# Patient Record
Sex: FEMALE | Race: OTHER | Employment: OTHER | URBAN - METROPOLITAN AREA
[De-identification: names, ages, dates, MRNs, and addresses within clinical notes are randomized per-mention and may not be internally consistent; named-entity substitution may affect disease eponyms.]

---

## 2017-01-09 ENCOUNTER — HOSPITAL ENCOUNTER (OUTPATIENT)
Dept: GENERAL RADIOLOGY | Age: 72
Discharge: HOME | End: 2017-01-09
Payer: MEDICARE

## 2017-01-09 DIAGNOSIS — R05.9 COUGH: ICD-10-CM

## 2017-01-09 PROCEDURE — 71020 XR CHEST PA LAT: CPT

## 2017-09-18 PROBLEM — I77.9 BILATERAL CAROTID ARTERY DISEASE (HCC): Status: ACTIVE | Noted: 2017-09-18

## 2017-09-19 ENCOUNTER — HOSPITAL ENCOUNTER (OUTPATIENT)
Dept: LAB | Age: 72
Discharge: HOME | End: 2017-09-19
Payer: MEDICARE

## 2017-09-19 DIAGNOSIS — I25.10 CORONARY ARTERY DISEASE INVOLVING NATIVE CORONARY ARTERY OF NATIVE HEART WITHOUT ANGINA PECTORIS: ICD-10-CM

## 2017-09-19 DIAGNOSIS — E78.00 PURE HYPERCHOLESTEROLEMIA: Chronic | ICD-10-CM

## 2017-09-19 LAB
ALBUMIN SERPL-MCNC: 3.9 G/DL (ref 3.2–4.6)
ALBUMIN/GLOB SERPL: 0.9 {RATIO} (ref 1.2–3.5)
ALP SERPL-CCNC: 84 U/L (ref 50–136)
ALT SERPL-CCNC: 31 U/L (ref 12–65)
ANION GAP SERPL CALC-SCNC: 10 MMOL/L (ref 7–16)
AST SERPL-CCNC: 28 U/L (ref 15–37)
BASOPHILS # BLD: 0 K/UL (ref 0–0.2)
BASOPHILS NFR BLD: 0 % (ref 0–2)
BILIRUB SERPL-MCNC: 0.4 MG/DL (ref 0.2–1.1)
BUN SERPL-MCNC: 18 MG/DL (ref 8–23)
CALCIUM SERPL-MCNC: 9 MG/DL (ref 8.3–10.4)
CHLORIDE SERPL-SCNC: 107 MMOL/L (ref 98–107)
CHOLEST SERPL-MCNC: 208 MG/DL
CO2 SERPL-SCNC: 27 MMOL/L (ref 21–32)
CREAT SERPL-MCNC: 0.68 MG/DL (ref 0.6–1)
DIFFERENTIAL METHOD BLD: NORMAL
EOSINOPHIL # BLD: 0.1 K/UL (ref 0–0.8)
EOSINOPHIL NFR BLD: 3 % (ref 0.5–7.8)
ERYTHROCYTE [DISTWIDTH] IN BLOOD BY AUTOMATED COUNT: 14.3 % (ref 11.9–14.6)
GLOBULIN SER CALC-MCNC: 4.5 G/DL (ref 2.3–3.5)
GLUCOSE SERPL-MCNC: 104 MG/DL (ref 65–100)
HCT VFR BLD AUTO: 40.1 % (ref 35.8–46.3)
HDLC SERPL-MCNC: 44 MG/DL (ref 40–60)
HDLC SERPL: 4.7 {RATIO}
HGB BLD-MCNC: 13.6 G/DL (ref 11.7–15.4)
IMM GRANULOCYTES # BLD: 0 K/UL (ref 0–0.5)
IMM GRANULOCYTES NFR BLD: 0 % (ref 0–5)
LDLC SERPL CALC-MCNC: 120.2 MG/DL
LIPID PROFILE,FLP: ABNORMAL
LYMPHOCYTES # BLD: 2 K/UL (ref 0.5–4.6)
LYMPHOCYTES NFR BLD: 43 % (ref 13–44)
MAGNESIUM SERPL-MCNC: 2.3 MG/DL (ref 1.8–2.4)
MCH RBC QN AUTO: 28.6 PG (ref 26.1–32.9)
MCHC RBC AUTO-ENTMCNC: 33.9 G/DL (ref 31.4–35)
MCV RBC AUTO: 84.4 FL (ref 79.6–97.8)
MONOCYTES # BLD: 0.3 K/UL (ref 0.1–1.3)
MONOCYTES NFR BLD: 6 % (ref 4–12)
NEUTS SEG # BLD: 2.3 K/UL (ref 1.7–8.2)
NEUTS SEG NFR BLD: 48 % (ref 43–78)
PLATELET # BLD AUTO: 197 K/UL (ref 150–450)
PMV BLD AUTO: 12.2 FL (ref 10.8–14.1)
POTASSIUM SERPL-SCNC: 4 MMOL/L (ref 3.5–5.1)
PROT SERPL-MCNC: 8.4 G/DL (ref 6.3–8.2)
RBC # BLD AUTO: 4.75 M/UL (ref 4.05–5.25)
SODIUM SERPL-SCNC: 144 MMOL/L (ref 136–145)
TRIGL SERPL-MCNC: 219 MG/DL (ref 35–150)
TSH SERPL DL<=0.005 MIU/L-ACNC: 1.81 UIU/ML (ref 0.36–3.74)
VLDLC SERPL CALC-MCNC: 43.8 MG/DL (ref 6–23)
WBC # BLD AUTO: 4.8 K/UL (ref 4.3–11.1)

## 2017-09-19 PROCEDURE — 36415 COLL VENOUS BLD VENIPUNCTURE: CPT | Performed by: INTERNAL MEDICINE

## 2017-09-19 PROCEDURE — 83735 ASSAY OF MAGNESIUM: CPT | Performed by: INTERNAL MEDICINE

## 2017-09-19 PROCEDURE — 85025 COMPLETE CBC W/AUTO DIFF WBC: CPT | Performed by: INTERNAL MEDICINE

## 2017-09-19 PROCEDURE — 80053 COMPREHEN METABOLIC PANEL: CPT | Performed by: INTERNAL MEDICINE

## 2017-09-19 PROCEDURE — 84443 ASSAY THYROID STIM HORMONE: CPT | Performed by: INTERNAL MEDICINE

## 2017-09-19 PROCEDURE — 80061 LIPID PANEL: CPT | Performed by: INTERNAL MEDICINE

## 2017-09-19 NOTE — PROGRESS NOTES
Please call patient, labs looked pretty good. TSH and Hb normal.  I hope she will feel better off the statin. Wait on NST as well. Review more at follow up.   Thanks

## 2017-12-26 ENCOUNTER — HOSPITAL ENCOUNTER (OUTPATIENT)
Dept: LAB | Age: 72
Discharge: HOME | End: 2017-12-26
Payer: MEDICARE

## 2017-12-26 DIAGNOSIS — E78.00 PURE HYPERCHOLESTEROLEMIA: Chronic | ICD-10-CM

## 2017-12-26 LAB
ALBUMIN SERPL-MCNC: 3.7 G/DL (ref 3.2–4.6)
ALBUMIN/GLOB SERPL: 0.8 {RATIO} (ref 1.2–3.5)
ALP SERPL-CCNC: 89 U/L (ref 50–136)
ALT SERPL-CCNC: 46 U/L (ref 12–65)
ANION GAP SERPL CALC-SCNC: 8 MMOL/L (ref 7–16)
AST SERPL-CCNC: 41 U/L (ref 15–37)
BILIRUB SERPL-MCNC: 0.3 MG/DL (ref 0.2–1.1)
BUN SERPL-MCNC: 21 MG/DL (ref 8–23)
CALCIUM SERPL-MCNC: 9.4 MG/DL (ref 8.3–10.4)
CHLORIDE SERPL-SCNC: 106 MMOL/L (ref 98–107)
CHOLEST SERPL-MCNC: 248 MG/DL
CO2 SERPL-SCNC: 27 MMOL/L (ref 21–32)
CREAT SERPL-MCNC: 0.77 MG/DL (ref 0.6–1)
GLOBULIN SER CALC-MCNC: 4.8 G/DL (ref 2.3–3.5)
GLUCOSE SERPL-MCNC: 146 MG/DL (ref 65–100)
HDLC SERPL-MCNC: 40 MG/DL (ref 40–60)
HDLC SERPL: 6.2 {RATIO}
LDLC SERPL CALC-MCNC: ABNORMAL MG/DL
LDLC SERPL DIRECT ASSAY-MCNC: 96 MG/DL
LIPID PROFILE,FLP: ABNORMAL
POTASSIUM SERPL-SCNC: 4.5 MMOL/L (ref 3.5–5.1)
PROT SERPL-MCNC: 8.5 G/DL (ref 6.3–8.2)
SODIUM SERPL-SCNC: 141 MMOL/L (ref 136–145)
TRIGL SERPL-MCNC: >400 MG/DL (ref 35–150)
VLDLC SERPL CALC-MCNC: ABNORMAL MG/DL (ref 6–23)

## 2017-12-26 PROCEDURE — 83721 ASSAY OF BLOOD LIPOPROTEIN: CPT | Performed by: INTERNAL MEDICINE

## 2017-12-26 PROCEDURE — 36415 COLL VENOUS BLD VENIPUNCTURE: CPT | Performed by: INTERNAL MEDICINE

## 2017-12-26 PROCEDURE — 80061 LIPID PANEL: CPT | Performed by: INTERNAL MEDICINE

## 2017-12-26 PROCEDURE — 80053 COMPREHEN METABOLIC PANEL: CPT | Performed by: INTERNAL MEDICINE

## 2018-09-24 ENCOUNTER — HOSPITAL ENCOUNTER (OUTPATIENT)
Dept: LAB | Age: 73
Discharge: HOME | End: 2018-09-24
Payer: MEDICARE

## 2018-09-24 DIAGNOSIS — E78.00 PURE HYPERCHOLESTEROLEMIA: Chronic | ICD-10-CM

## 2018-09-24 LAB
ALBUMIN SERPL-MCNC: 3.8 G/DL (ref 3.2–4.6)
ALBUMIN/GLOB SERPL: 0.9 {RATIO} (ref 1.2–3.5)
ALP SERPL-CCNC: 76 U/L (ref 50–136)
ALT SERPL-CCNC: 55 U/L (ref 12–65)
ANION GAP SERPL CALC-SCNC: 4 MMOL/L (ref 7–16)
AST SERPL-CCNC: 56 U/L (ref 15–37)
BILIRUB SERPL-MCNC: 0.3 MG/DL (ref 0.2–1.1)
BUN SERPL-MCNC: 17 MG/DL (ref 8–23)
CALCIUM SERPL-MCNC: 8.8 MG/DL (ref 8.3–10.4)
CHLORIDE SERPL-SCNC: 107 MMOL/L (ref 98–107)
CHOLEST SERPL-MCNC: 97 MG/DL
CO2 SERPL-SCNC: 30 MMOL/L (ref 21–32)
CREAT SERPL-MCNC: 0.99 MG/DL (ref 0.6–1)
EST. AVERAGE GLUCOSE BLD GHB EST-MCNC: 128 MG/DL
GLOBULIN SER CALC-MCNC: 4.4 G/DL (ref 2.3–3.5)
GLUCOSE SERPL-MCNC: 134 MG/DL (ref 65–100)
HBA1C MFR BLD: 6.1 % (ref 4.8–6)
HDLC SERPL-MCNC: 38 MG/DL (ref 40–60)
HDLC SERPL: 2.6 {RATIO}
LDLC SERPL CALC-MCNC: ABNORMAL MG/DL
LIPID PROFILE,FLP: ABNORMAL
POTASSIUM SERPL-SCNC: 5 MMOL/L (ref 3.5–5.1)
PROT SERPL-MCNC: 8.2 G/DL (ref 6.3–8.2)
SODIUM SERPL-SCNC: 141 MMOL/L (ref 136–145)
TRIGL SERPL-MCNC: 324 MG/DL (ref 35–150)
VLDLC SERPL CALC-MCNC: 64.8 MG/DL (ref 6–23)

## 2018-09-24 PROCEDURE — 80053 COMPREHEN METABOLIC PANEL: CPT

## 2018-09-24 PROCEDURE — 83036 HEMOGLOBIN GLYCOSYLATED A1C: CPT

## 2018-09-24 PROCEDURE — 36415 COLL VENOUS BLD VENIPUNCTURE: CPT

## 2018-09-24 PROCEDURE — 80061 LIPID PANEL: CPT

## 2019-04-05 ENCOUNTER — HOSPITAL ENCOUNTER (OUTPATIENT)
Dept: LAB | Age: 74
Discharge: HOME | End: 2019-04-05
Payer: MEDICARE

## 2019-04-05 DIAGNOSIS — I25.10 CORONARY ARTERY DISEASE INVOLVING NATIVE CORONARY ARTERY OF NATIVE HEART WITHOUT ANGINA PECTORIS: ICD-10-CM

## 2019-04-05 LAB
ALBUMIN SERPL-MCNC: 4.1 G/DL (ref 3.2–4.6)
ALBUMIN/GLOB SERPL: 1.1 {RATIO} (ref 1.2–3.5)
ALP SERPL-CCNC: 73 U/L (ref 50–136)
ALT SERPL-CCNC: 35 U/L (ref 12–65)
ANION GAP SERPL CALC-SCNC: 5 MMOL/L (ref 7–16)
AST SERPL-CCNC: 18 U/L (ref 15–37)
BASOPHILS # BLD: 0 K/UL (ref 0–0.2)
BASOPHILS NFR BLD: 1 % (ref 0–2)
BILIRUB SERPL-MCNC: 0.3 MG/DL (ref 0.2–1.1)
BUN SERPL-MCNC: 21 MG/DL (ref 8–23)
CALCIUM SERPL-MCNC: 9 MG/DL (ref 8.3–10.4)
CHLORIDE SERPL-SCNC: 108 MMOL/L (ref 98–107)
CHOLEST SERPL-MCNC: 209 MG/DL
CO2 SERPL-SCNC: 27 MMOL/L (ref 21–32)
CREAT SERPL-MCNC: 0.74 MG/DL (ref 0.6–1)
DIFFERENTIAL METHOD BLD: NORMAL
EOSINOPHIL # BLD: 0.2 K/UL (ref 0–0.8)
EOSINOPHIL NFR BLD: 4 % (ref 0.5–7.8)
ERYTHROCYTE [DISTWIDTH] IN BLOOD BY AUTOMATED COUNT: 13.8 % (ref 11.9–14.6)
EST. AVERAGE GLUCOSE BLD GHB EST-MCNC: 171 MG/DL
GLOBULIN SER CALC-MCNC: 3.7 G/DL (ref 2.3–3.5)
GLUCOSE SERPL-MCNC: 131 MG/DL (ref 65–100)
HBA1C MFR BLD: 7.6 % (ref 4.8–6)
HCT VFR BLD AUTO: 40.9 % (ref 35.8–46.3)
HDLC SERPL-MCNC: 44 MG/DL (ref 40–60)
HDLC SERPL: 4.8 {RATIO}
HGB BLD-MCNC: 13.3 G/DL (ref 11.7–15.4)
IMM GRANULOCYTES # BLD AUTO: 0 K/UL (ref 0–0.5)
IMM GRANULOCYTES NFR BLD AUTO: 0 % (ref 0–5)
LDLC SERPL CALC-MCNC: 108.6 MG/DL
LIPID PROFILE,FLP: ABNORMAL
LYMPHOCYTES # BLD: 2.3 K/UL (ref 0.5–4.6)
LYMPHOCYTES NFR BLD: 41 % (ref 13–44)
MCH RBC QN AUTO: 28.9 PG (ref 26.1–32.9)
MCHC RBC AUTO-ENTMCNC: 32.5 G/DL (ref 31.4–35)
MCV RBC AUTO: 88.9 FL (ref 79.6–97.8)
MONOCYTES # BLD: 0.6 K/UL (ref 0.1–1.3)
MONOCYTES NFR BLD: 10 % (ref 4–12)
NEUTS SEG # BLD: 2.5 K/UL (ref 1.7–8.2)
NEUTS SEG NFR BLD: 44 % (ref 43–78)
NRBC # BLD: 0 K/UL (ref 0–0.2)
PLATELET # BLD AUTO: 219 K/UL (ref 150–450)
PMV BLD AUTO: 11.9 FL (ref 9.4–12.3)
POTASSIUM SERPL-SCNC: 4.6 MMOL/L (ref 3.5–5.1)
PROT SERPL-MCNC: 7.8 G/DL (ref 6.3–8.2)
RBC # BLD AUTO: 4.6 M/UL (ref 4.05–5.2)
SODIUM SERPL-SCNC: 140 MMOL/L (ref 136–145)
TRIGL SERPL-MCNC: 282 MG/DL (ref 35–150)
TSH SERPL DL<=0.005 MIU/L-ACNC: 1.42 UIU/ML (ref 0.36–3.74)
VLDLC SERPL CALC-MCNC: 56.4 MG/DL (ref 6–23)
WBC # BLD AUTO: 5.7 K/UL (ref 4.3–11.1)

## 2019-04-05 PROCEDURE — 83036 HEMOGLOBIN GLYCOSYLATED A1C: CPT

## 2019-04-05 PROCEDURE — 85025 COMPLETE CBC W/AUTO DIFF WBC: CPT

## 2019-04-05 PROCEDURE — 84443 ASSAY THYROID STIM HORMONE: CPT

## 2019-04-05 PROCEDURE — 80061 LIPID PANEL: CPT

## 2019-04-05 PROCEDURE — 80053 COMPREHEN METABOLIC PANEL: CPT

## 2019-04-05 PROCEDURE — 36415 COLL VENOUS BLD VENIPUNCTURE: CPT

## 2022-03-20 PROBLEM — I77.9 BILATERAL CAROTID ARTERY DISEASE (HCC): Status: ACTIVE | Noted: 2017-09-18

## 2022-06-24 ENCOUNTER — TELEPHONE (OUTPATIENT)
Dept: CARDIOLOGY CLINIC | Age: 77
End: 2022-06-24

## 2022-06-24 NOTE — TELEPHONE ENCOUNTER
Spoke with patient- She reports having a cough for 1 month. Her last OV was 1/30/2020 Recommended she follow up with PCP or Urgent Care for evaluation, but scheduled appt for 8/31/22 in [facility] at 9:45am with Dr. Nisreen Gallardo. . She verbalized understanding.

## 2022-08-31 ENCOUNTER — OFFICE VISIT (OUTPATIENT)
Dept: CARDIOLOGY CLINIC | Age: 77
End: 2022-08-31
Payer: COMMERCIAL

## 2022-08-31 VITALS
HEART RATE: 78 BPM | HEIGHT: 61 IN | BODY MASS INDEX: 32.1 KG/M2 | WEIGHT: 170 LBS | DIASTOLIC BLOOD PRESSURE: 89 MMHG | SYSTOLIC BLOOD PRESSURE: 179 MMHG

## 2022-08-31 DIAGNOSIS — E78.00 PURE HYPERCHOLESTEROLEMIA: ICD-10-CM

## 2022-08-31 DIAGNOSIS — I65.23 BILATERAL CAROTID ARTERY STENOSIS: ICD-10-CM

## 2022-08-31 DIAGNOSIS — I10 ESSENTIAL HYPERTENSION WITH GOAL BLOOD PRESSURE LESS THAN 140/90: Primary | ICD-10-CM

## 2022-08-31 DIAGNOSIS — I25.10 CORONARY ARTERY DISEASE INVOLVING NATIVE CORONARY ARTERY OF NATIVE HEART WITHOUT ANGINA PECTORIS: ICD-10-CM

## 2022-08-31 DIAGNOSIS — Z95.1 S/P CABG X 4: ICD-10-CM

## 2022-08-31 PROCEDURE — 99214 OFFICE O/P EST MOD 30 MIN: CPT | Performed by: INTERNAL MEDICINE

## 2022-08-31 PROCEDURE — 93000 ELECTROCARDIOGRAM COMPLETE: CPT | Performed by: INTERNAL MEDICINE

## 2022-08-31 PROCEDURE — 1123F ACP DISCUSS/DSCN MKR DOCD: CPT | Performed by: INTERNAL MEDICINE

## 2022-08-31 RX ORDER — CHLORAL HYDRATE 500 MG
CAPSULE ORAL
COMMUNITY

## 2022-08-31 RX ORDER — MAGNESIUM 30 MG
30 TABLET ORAL 2 TIMES DAILY
COMMUNITY

## 2022-08-31 RX ORDER — MULTIVIT WITH MINERALS/LUTEIN
250 TABLET ORAL DAILY
COMMUNITY

## 2022-08-31 RX ORDER — IBUPROFEN 200 MG
200 TABLET ORAL EVERY 6 HOURS PRN
COMMUNITY

## 2022-08-31 NOTE — PROGRESS NOTES
[address], [address] [facility], [address] [place] [address]  PHONE: [phone]     21    NAME:  Ximena Herrera  : 1945  MRN: [id number]       SUBJECTIVE:   Ximena Herrera is a 67 y.o. female seen for a follow up visit regarding the following:     Chief Complaint   Patient presents with    Coronary Artery Disease     Yearly, Last 2020        HPI:    Here for CAD, 4v CABG on 16 by Dr. Jose Luis Butterfield. Normal EF on echo. Less than 50% ICAs . Echo 2019: normal EF. Ace cough     Getting over PNA now. No CP, pressure. Some GIBBONS, some rare back pains. Some mid back pains with exercise. Been on avx. Patient denies recent history of orthopnea, PND, excessive dizziness and/or syncope. Ace cough after CABG. From [facility], here since . Past Medical History, Past Surgical History, Family history, Social History, and Medications were all reviewed with the patient today and updated as necessary. Current Outpatient Medications   Medication Sig Dispense Refill    magnesium 30 MG tablet Take 30 mg by mouth 2 times daily      Omega-3 1000 MG CAPS Take by mouth      Ascorbic Acid (VITAMIN C) 250 MG tablet Take 250 mg by mouth daily      Garlic 10 MG CAPS Take by mouth      Potassium 75 MG TABS Take by mouth      ibuprofen (ADVIL;MOTRIN) 200 MG tablet Take 200 mg by mouth every 6 hours as needed for Pain      aspirin 81 MG EC tablet Take 81 mg by mouth daily      ferrous sulfate (IRON 325) 325 (65 Fe) MG tablet Take 325 mg by mouth daily (with breakfast)       No current facility-administered medications for this visit. Allergies   Allergen Reactions    Ace Inhibitors Other (See Comments)    Penicillins Other (See Comments)     Head felt \"swimmy,\" occurred at 22 yrs of age.      Patient Active Problem List    Diagnosis Date Noted    Bilateral carotid artery disease ([facility] [address] 2017    Essential hypertension with goal blood pressure less than 140/90  08/29/2019 02:37 PM    K 4.1 08/29/2019 02:37 PM     08/29/2019 02:37 PM    CO2 22 08/29/2019 02:37 PM    BUN 20 08/29/2019 02:37 PM    CREATININE 0.72 08/29/2019 02:37 PM    GLUCOSE 101 08/29/2019 02:37 PM    CALCIUM 9.6 08/29/2019 02:37 PM        No results found for: WBC, HGB, HCT, MCV, PLT    No results found for: TSHFT4, TSH    Lab Results   Component Value Date    LABA1C 6.7 (H) 08/29/2019     Lab Results   Component Value Date     08/29/2019       Lab Results   Component Value Date    CHOL 262 (H) 08/29/2019     Lab Results   Component Value Date    TRIG 482 (H) 08/29/2019     Lab Results   Component Value Date    HDL 38 (L) 08/29/2019     Lab Results   Component Value Date    LDLCALC Comment 08/29/2019     Lab Results   Component Value Date    LABVLDL Comment 08/29/2019     No results found for: CHOLHDLRATIO        I have Independently reviewed prior care notes, any ER records available, cardiac testing, labs and results with the patient and before seeing the patient today. Also independently reviewed outside records when available. ASSESSMENT:    Fuentes Orr was seen today for coronary artery disease. Diagnoses and all orders for this visit:    Essential hypertension with goal blood pressure less than 140/90  -     EKG 12 lead    Bilateral carotid artery stenosis    Coronary artery disease involving native coronary artery of native heart without angina pectoris  -     Nuclear stress test with myocardial perfusion; Future    S/P CABG x 4  -     Nuclear stress test with myocardial perfusion; Future    Pure hypercholesterolemia        PLAN:         1. HTN:  BP better at home, she has extreme white coat HTN. Need to follow as stressed. 2. CAD: Remain on ASA, and off repatha. Work on diabetes mgmt, lipids mgmt, get back on repatha. Given these risk factors and symptoms as outlined, plan for NST.   We did review options of NST, LHC, other stress testing and agreed to proceed with NST in our office. To ER for worsening angina. Plan on definitive LHC for worsening angina. 3. HPL: off repatha and statin now. Need to re-address. 4. Carotid Dz: Re-eval in the future. On ASA now. Patient has been instructed and agrees to call our office with any issues or other concerns related to their cardiac condition(s) and/or complaint(s).         Return for Return After Test.       SANAZ PRADHAN,   8/31/2022

## 2022-09-27 ENCOUNTER — OFFICE VISIT (OUTPATIENT)
Dept: CARDIOLOGY CLINIC | Age: 77
End: 2022-09-27
Payer: COMMERCIAL

## 2022-09-27 VITALS
DIASTOLIC BLOOD PRESSURE: 70 MMHG | BODY MASS INDEX: 31.72 KG/M2 | WEIGHT: 168 LBS | SYSTOLIC BLOOD PRESSURE: 135 MMHG | HEART RATE: 80 BPM | HEIGHT: 61 IN

## 2022-09-27 DIAGNOSIS — I10 ESSENTIAL HYPERTENSION WITH GOAL BLOOD PRESSURE LESS THAN 140/90: ICD-10-CM

## 2022-09-27 DIAGNOSIS — I65.23 BILATERAL CAROTID ARTERY STENOSIS: ICD-10-CM

## 2022-09-27 DIAGNOSIS — I25.119 CORONARY ARTERY DISEASE INVOLVING NATIVE CORONARY ARTERY OF NATIVE HEART WITH ANGINA PECTORIS (HCC): Primary | ICD-10-CM

## 2022-09-27 PROCEDURE — 99214 OFFICE O/P EST MOD 30 MIN: CPT | Performed by: INTERNAL MEDICINE

## 2022-09-27 PROCEDURE — 1123F ACP DISCUSS/DSCN MKR DOCD: CPT | Performed by: INTERNAL MEDICINE

## 2022-09-27 NOTE — TELEPHONE ENCOUNTER
----- Message from Nathan Fry DO sent at 9/27/2022 10:12 AM EDT -----  Can we get her back on repatha, could not afford it.   Thanks

## 2022-09-27 NOTE — PROGRESS NOTES
[address], [address] [facility], [address] [place] [address]  PHONE: [phone]     21    NAME:  Lilly Orellana  : 1945  MRN: [id number]       SUBJECTIVE:   Lilly Orellana is a 67 y.o. female seen for a follow up visit regarding the following:     Chief Complaint   Patient presents with    Coronary Artery Disease     [facility]ite f/u       HPI:  Here for CAD, 4v CABG on 16 by Dr. Alberto Moses. Less than 50% ICAs . Echo 2019: normal EF. Ace cough    NST 2022: There is a moderate severity left ventricular stress perfusion defect that is medium to large in size present in the inferolateral segment(s) that is partially reversible. EF 44%. Feeling better now, GIBBONS better since PNA. No CP, pressure. Energy better now. Patient denies recent history of orthopnea, PND, excessive dizziness and/or syncope. Ace cough after CABG. From [facility], here since . Past Medical History, Past Surgical History, Family history, Social History, and Medications were all reviewed with the patient today and updated as necessary. Current Outpatient Medications   Medication Sig Dispense Refill    magnesium 30 MG tablet Take 30 mg by mouth 2 times daily      Omega-3 1000 MG CAPS Take by mouth      Ascorbic Acid (VITAMIN C) 250 MG tablet Take 250 mg by mouth daily      Garlic 10 MG CAPS Take by mouth      Potassium 75 MG TABS Take by mouth      ibuprofen (ADVIL;MOTRIN) 200 MG tablet Take 200 mg by mouth every 6 hours as needed for Pain      aspirin 81 MG EC tablet Take 81 mg by mouth daily      ferrous sulfate (IRON 325) 325 (65 Fe) MG tablet Take 325 mg by mouth daily (with breakfast)       No current facility-administered medications for this visit. Allergies   Allergen Reactions    Ace Inhibitors Other (See Comments)    Penicillins Other (See Comments)     Head felt \"swimmy,\" occurred at 22 yrs of age.      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 History of breast cancer 06/17/2016    Dyspnea on exertion 06/17/2016    Chest pain 06/17/2016    GERD (gastroesophageal reflux disease) 06/17/2016    Colon cancer screening 06/17/2016      Past Surgical History:   Procedure Laterality Date    BREAST LUMPECTOMY Left     CARDIAC VALVE SURGERY  2006    CORONARY ARTERY BYPASS GRAFT  7/27/16    x 4 vessels, Dr. Yosi Rodriges (CERVIX STATUS UNKNOWN)       History reviewed. No pertinent family history. Social History     Tobacco Use    Smoking status: Never    Smokeless tobacco: Never   Substance Use Topics    Alcohol use: Yes     Alcohol/week: 1.0 standard drink         ROS:    No obvious pertinent positives on review of systems except for what was outlined in the HPI today.       PHYSICAL EXAM:     /70 Comment: home re-check she claims  Pulse 80   Ht 5' 1\" (1.549 m)   Wt 168 lb (76.2 kg)   BMI 31.74 kg/m²    General/Constitutional:   Alert and oriented x 3, no acute distress  HEENT:   normocephalic, atraumatic, moist mucous membranes  Neck:   No JVD or carotid bruits bilaterally  Cardiovascular:   regular rate and rhythm, no murmur/rub/gallop appreciated  Pulmonary:   clear to auscultation bilaterally, no respiratory distress  Abdomen:   soft, non-tender, non-distended  Ext:   No sig LE edema bilaterally  Skin:  warm and dry, no obvious rashes seen  Neuro:   no obvious sensory or motor deficits  Psychiatric:   normal mood and affect      Lab Results   Component Value Date/Time     08/29/2019 02:37 PM    K 4.1 08/29/2019 02:37 PM     08/29/2019 02:37 PM    CO2 22 08/29/2019 02:37 PM    BUN 20 08/29/2019 02:37 PM    CREATININE 0.72 08/29/2019 02:37 PM    GLUCOSE 101 08/29/2019 02:37 PM    CALCIUM 9.6 08/29/2019 02:37 PM        No results found for: WBC, HGB, HCT, MCV, PLT    No results found for: TSHFT4, TSH    Lab Results   Component Value Date    LABA1C 6.7 (H) 08/29/2019     Lab Results   Component Value Date     08/29/2019       Lab Results   Component Value Date    CHOL 262 (H) 08/29/2019     Lab Results   Component Value Date    TRIG 482 (H) 08/29/2019     Lab Results   Component Value Date    HDL 38 (L) 08/29/2019     Lab Results   Component Value Date    LDLCALC Comment 08/29/2019     Lab Results   Component Value Date    LABVLDL Comment 08/29/2019     No results found for: CHOLHDLRATIO        I have Independently reviewed prior care notes, any ER records available, cardiac testing, labs and results with the patient and before seeing the patient today. Also independently reviewed outside records when available. ASSESSMENT:    Gabriel Rodriguez was seen today for coronary artery disease. Diagnoses and all orders for this visit:    Coronary artery disease involving native coronary artery of native heart with angina pectoris (HCC)  -     Transthoracic echocardiogram (TTE) complete with contrast, bubble, strain, and 3D PRN; Future    Bilateral carotid artery stenosis    Essential hypertension with goal blood pressure less than 140/90        PLAN:    1. HTN:  BP better at home, she has extreme white coat HTN. Need to follow as stressed. 2. CAD: Remain on ASA, and off repatha. Work on diabetes mgmt, lipids mgmt, get back on repatha. NST noted, but feeling better now, hold on [facility]. Check echo and call back PRN. The patient has been instructed to call with any angina or equivalent as reviewed today.  All questions were answered with the patient voicing complete understanding. 3. HPL:  will try to get back on repatha now. 4. Carotid Dz: Re-eval in the future. On ASA now. Patient has been instructed and agrees to call our office with any issues or other concerns related to their cardiac condition(s) and/or complaint(s). Return in about 3 months (around 12/27/2022).        SANAZ PRADHAN, DO  9/27/2022

## 2022-10-05 ENCOUNTER — CLINICAL DOCUMENTATION (OUTPATIENT)
Dept: CARDIOLOGY CLINIC | Age: 77
End: 2022-10-05

## 2022-10-05 NOTE — PROGRESS NOTES
PA for repatha submitted via [facility] and is under review at this time.

## 2022-11-01 ENCOUNTER — NURSE ONLY (OUTPATIENT)
Dept: FAMILY MEDICINE CLINIC | Facility: CLINIC | Age: 77
End: 2022-11-01

## 2022-11-01 ENCOUNTER — OFFICE VISIT (OUTPATIENT)
Dept: FAMILY MEDICINE CLINIC | Facility: CLINIC | Age: 77
End: 2022-11-01
Payer: COMMERCIAL

## 2022-11-01 VITALS
SYSTOLIC BLOOD PRESSURE: 162 MMHG | HEART RATE: 70 BPM | DIASTOLIC BLOOD PRESSURE: 80 MMHG | RESPIRATION RATE: 18 BRPM | HEIGHT: 61 IN | TEMPERATURE: 98.1 F | OXYGEN SATURATION: 98 % | BODY MASS INDEX: 32.66 KG/M2 | WEIGHT: 173 LBS

## 2022-11-01 DIAGNOSIS — E78.00 PURE HYPERCHOLESTEROLEMIA: ICD-10-CM

## 2022-11-01 DIAGNOSIS — I25.119 CORONARY ARTERY DISEASE INVOLVING NATIVE CORONARY ARTERY OF NATIVE HEART WITH ANGINA PECTORIS (HCC): ICD-10-CM

## 2022-11-01 DIAGNOSIS — I10 ESSENTIAL HYPERTENSION: ICD-10-CM

## 2022-11-01 DIAGNOSIS — R06.02 SOB (SHORTNESS OF BREATH) ON EXERTION: ICD-10-CM

## 2022-11-01 DIAGNOSIS — E11.59 TYPE 2 DIABETES MELLITUS WITH OTHER CIRCULATORY COMPLICATIONS (HCC): ICD-10-CM

## 2022-11-01 DIAGNOSIS — Z00.00 MEDICARE ANNUAL WELLNESS VISIT, SUBSEQUENT: Primary | ICD-10-CM

## 2022-11-01 DIAGNOSIS — K21.00 GASTROESOPHAGEAL REFLUX DISEASE WITH ESOPHAGITIS WITHOUT HEMORRHAGE: ICD-10-CM

## 2022-11-01 LAB
ALBUMIN SERPL-MCNC: 3.9 G/DL (ref 3.2–4.6)
ALBUMIN/GLOB SERPL: 1.1 {RATIO} (ref 0.4–1.6)
ALP SERPL-CCNC: 65 U/L (ref 50–136)
ALT SERPL-CCNC: 37 U/L (ref 12–65)
ANION GAP SERPL CALC-SCNC: 8 MMOL/L (ref 2–11)
AST SERPL-CCNC: 24 U/L (ref 15–37)
BASOPHILS # BLD: 0.1 K/UL (ref 0–0.2)
BASOPHILS NFR BLD: 1 % (ref 0–2)
BILIRUB SERPL-MCNC: 0.2 MG/DL (ref 0.2–1.1)
BUN SERPL-MCNC: 15 MG/DL (ref 8–23)
CALCIUM SERPL-MCNC: 9.5 MG/DL (ref 8.3–10.4)
CHLORIDE SERPL-SCNC: 104 MMOL/L (ref 101–110)
CHOLEST SERPL-MCNC: 226 MG/DL
CO2 SERPL-SCNC: 26 MMOL/L (ref 21–32)
CREAT SERPL-MCNC: 0.7 MG/DL (ref 0.6–1)
DIFFERENTIAL METHOD BLD: NORMAL
EOSINOPHIL # BLD: 0.2 K/UL (ref 0–0.8)
EOSINOPHIL NFR BLD: 3 % (ref 0.5–7.8)
ERYTHROCYTE [DISTWIDTH] IN BLOOD BY AUTOMATED COUNT: 13.9 % (ref 11.9–14.6)
EST. AVERAGE GLUCOSE BLD GHB EST-MCNC: 160 MG/DL
GLOBULIN SER CALC-MCNC: 3.6 G/DL (ref 2.8–4.5)
GLUCOSE SERPL-MCNC: 124 MG/DL (ref 65–100)
HBA1C MFR BLD: 7.2 % (ref 4.8–5.6)
HCT VFR BLD AUTO: 40.4 % (ref 35.8–46.3)
HDLC SERPL-MCNC: 37 MG/DL (ref 40–60)
HDLC SERPL: 6.1 {RATIO}
HGB BLD-MCNC: 12.9 G/DL (ref 11.7–15.4)
IMM GRANULOCYTES # BLD AUTO: 0 K/UL (ref 0–0.5)
IMM GRANULOCYTES NFR BLD AUTO: 0 % (ref 0–5)
LDLC SERPL CALC-MCNC: ABNORMAL MG/DL
LDLC SERPL DIRECT ASSAY-MCNC: 65 MG/DL
LYMPHOCYTES # BLD: 2.8 K/UL (ref 0.5–4.6)
LYMPHOCYTES NFR BLD: 39 % (ref 13–44)
MCH RBC QN AUTO: 29.1 PG (ref 26.1–32.9)
MCHC RBC AUTO-ENTMCNC: 31.9 G/DL (ref 31.4–35)
MCV RBC AUTO: 91.2 FL (ref 82–102)
MONOCYTES # BLD: 0.5 K/UL (ref 0.1–1.3)
MONOCYTES NFR BLD: 7 % (ref 4–12)
NEUTS SEG # BLD: 3.6 K/UL (ref 1.7–8.2)
NEUTS SEG NFR BLD: 50 % (ref 43–78)
NRBC # BLD: 0 K/UL (ref 0–0.2)
PLATELET # BLD AUTO: 194 K/UL (ref 150–450)
PMV BLD AUTO: 12 FL (ref 9.4–12.3)
POTASSIUM SERPL-SCNC: 4 MMOL/L (ref 3.5–5.1)
PROT SERPL-MCNC: 7.5 G/DL (ref 6.3–8.2)
RBC # BLD AUTO: 4.43 M/UL (ref 4.05–5.2)
SODIUM SERPL-SCNC: 138 MMOL/L (ref 133–143)
TRIGL SERPL-MCNC: 591 MG/DL (ref 35–150)
VLDLC SERPL CALC-MCNC: 118.2 MG/DL (ref 6–23)
WBC # BLD AUTO: 7.1 K/UL (ref 4.3–11.1)

## 2022-11-01 PROCEDURE — G0439 PPPS, SUBSEQ VISIT: HCPCS | Performed by: FAMILY MEDICINE

## 2022-11-01 PROCEDURE — 1123F ACP DISCUSS/DSCN MKR DOCD: CPT | Performed by: FAMILY MEDICINE

## 2022-11-01 PROCEDURE — 3075F SYST BP GE 130 - 139MM HG: CPT | Performed by: FAMILY MEDICINE

## 2022-11-01 PROCEDURE — 3078F DIAST BP <80 MM HG: CPT | Performed by: FAMILY MEDICINE

## 2022-11-01 ASSESSMENT — PATIENT HEALTH QUESTIONNAIRE - PHQ9
1. LITTLE INTEREST OR PLEASURE IN DOING THINGS: 1
SUM OF ALL RESPONSES TO PHQ QUESTIONS 1-9: 1

## 2022-11-01 ASSESSMENT — LIFESTYLE VARIABLES
HOW MANY STANDARD DRINKS CONTAINING ALCOHOL DO YOU HAVE ON A TYPICAL DAY: 1 OR 2
HOW OFTEN DO YOU HAVE A DRINK CONTAINING ALCOHOL: MONTHLY OR LESS

## 2022-11-01 NOTE — PATIENT INSTRUCTIONS
PLAN:  1)  Referral to gastroenterology; for look into esophagus and stomach. 2)  Check labs today. We will call with results. 3)  We will get a CT scan to rule out other causes of shortness of breath and fatigue. 4)  Follow up in 3 months; sooner if needed. Personalized Preventive Plan for Dick Stewart - 11/1/2022  Medicare offers a range of preventive health benefits. Some of the tests and screenings are paid in full while other may be subject to a deductible, co-insurance, and/or copay. Some of these benefits include a comprehensive review of your medical history including lifestyle, illnesses that may run in your family, and various assessments and screenings as appropriate. After reviewing your medical record and screening and assessments performed today your provider may have ordered immunizations, labs, imaging, and/or referrals for you. A list of these orders (if applicable) as well as your Preventive Care list are included within your After Visit Summary for your review. Other Preventive Recommendations:    A preventive eye exam performed by an eye specialist is recommended every 1-2 years to screen for glaucoma; cataracts, macular degeneration, and other eye disorders. A preventive dental visit is recommended every 6 months. Try to get at least 150 minutes of exercise per week or 10,000 steps per day on a pedometer . Order or download the FREE \"Exercise & Physical Activity: Your Everyday Guide\" from [employer]. Call 1-664.830.2212 or search [employer] online. You need 5819-6733 mg of calcium and 2599-8448 IU of vitamin D per day. It is possible to meet your calcium requirement with diet alone, but a vitamin D supplement is usually necessary to meet this goal.  When exposed to the sun, use a sunscreen that protects against both UVA and UVB radiation with an SPF of 30 or greater.  Reapply every 2 to 3 hours or after sweating, drying off with a towel, or swimming. Always wear a seat belt when traveling in a car. Always wear a helmet when riding a bicycle or motorcycle.

## 2022-11-01 NOTE — PROGRESS NOTES
Medicare Annual Wellness Visit    Lemuel Traylor is here for Medicare AWV    Assessment & Plan   Medicare annual wellness visit, subsequent  Pure hypercholesterolemia  -     Lipid Panel; Future  -     CBC with Auto Differential; Future  Type 2 diabetes mellitus with other circulatory complications (HCC)  -     Hemoglobin A1C; Future  -     CBC with Auto Differential; Future  Coronary artery disease involving native coronary artery of native heart with angina pectoris ([address]  -     Lipid Panel; Future  -     Comprehensive Metabolic Panel; Future  Essential hypertension  SOB (shortness of breath) on exertion  -     CT CHEST WO CONTRAST; Future  Gastroesophageal reflux disease with esophagitis without hemorrhage  -     [facility]            PLAN:  1)  Referral to gastroenterology; for look into esophagus and stomach. 2)  Check labs today. We will call with results. 3)  We will get a CT scan to rule out other causes of shortness of breath and fatigue. 4)  Follow up in 3 months; sooner if needed. Recommendations for Preventive Services Due: see orders and patient instructions/AVS.  Recommended screening schedule for the next 5-10 years is provided to the patient in written form: see Patient Instructions/AVS.     Return in about 3 months (around 2/1/2023) for Labs today, EOV. Subjective     68 yo female with underlying NIDDM, HTN, CAD, s/p CABG, GERD, h/o breast cancer,   Has not been seen in our office for 3 years. She had pneumonia in June, 2022. Feels like she has not recovered since then. Getting short of breath with any exertion. H/o breast cancer in 2005. Then 10 normal mammogram.  Told she doesn't need. Recurrent acid reflux and burning. Has never had a EGD. HTN:  Reports it is good at home. She checks regulaly. Always elevated at Drs office. Has not been checking blood sugar or taking any medication for diabetes.   Last A1c was 3 years ago: 6.7 She has elevated cholesterol and triglycerides. Thought to be famial.   She sees cardiology and is on Repatha injections. Patient's complete Health Risk Assessment and screening values have been reviewed and are found in Flowsheets. The following problems were reviewed today and where indicated follow up appointments were made and/or referrals ordered. Positive Risk Factor Screenings with Interventions:             General Health and ACP:       Advance Directives       Power of  Living Will ACP-Advance Directive ACP-Power of     Not on File Not on File Not on File Not on File          General Health Risk Interventions:  Pain issues: referred to GI for chronic gasstritis    Health Habits/Nutrition:  Physical Activity: Not on file        Body mass index: (!) 32.68     Health Habits/Nutrition Interventions:  Inadequate physical activity:  patient is not ready to increase his/her physical activity level at this time             Objective   Vitals:    11/01/22 1132   BP: (!) 194/83   Pulse: 70   Resp: 18   Temp: 98.1 °F (36.7 °C)   TempSrc: Temporal   SpO2: 98%   Weight: 173 lb (78.5 kg)   Height: 5' 1\" (1.549 m)      Body mass index is 32.69 kg/m². Physical Exam  Vitals and nursing note reviewed. Constitutional:       General: She is not in acute distress. Appearance: She is obese. She is not ill-appearing or toxic-appearing. Cardiovascular:      Rate and Rhythm: Normal rate and regular rhythm. Pulmonary:      Effort: Pulmonary effort is normal.      Breath sounds: Normal breath sounds. Skin:     General: Skin is warm. Neurological:      General: No focal deficit present. Mental Status: She is alert and oriented to person, place, and time. Psychiatric:         Mood and Affect: Mood normal.         Behavior: Behavior normal.         Thought Content:  Thought content normal.         Judgment: Judgment normal.            Allergies   Allergen Reactions    Ace

## 2022-11-07 NOTE — RESULT ENCOUNTER NOTE
Please let her know. Labs look good except Diabetes number is up to 7.2. This means her bloodsugar has been borderline controlled. She needs to limit bread, pasta, potatoes, sweetened drinks as a way to control the diabetes. Follow up in February and we will recheck.

## 2022-11-10 ENCOUNTER — HOSPITAL ENCOUNTER (OUTPATIENT)
Dept: CT IMAGING | Age: 77
Discharge: HOME | End: 2022-11-13
Payer: COMMERCIAL

## 2022-11-10 DIAGNOSIS — R06.02 SOB (SHORTNESS OF BREATH) ON EXERTION: ICD-10-CM

## 2022-11-10 PROCEDURE — 71250 CT THORAX DX C-: CPT

## 2022-11-10 NOTE — RESULT ENCOUNTER NOTE
Please let her know, the CT scan of her chest shows her lungs look normal.  No reason seen for her shortness of breath. I will see her at her next visit in February.

## 2022-11-29 ENCOUNTER — NURSE ONLY (OUTPATIENT)
Dept: CARDIOLOGY CLINIC | Age: 77
End: 2022-11-29
Payer: COMMERCIAL

## 2022-11-29 DIAGNOSIS — I25.10 CAD (CORONARY ARTERY DISEASE): Primary | ICD-10-CM

## 2022-11-29 PROCEDURE — 93000 ELECTROCARDIOGRAM COMPLETE: CPT | Performed by: INTERNAL MEDICINE

## 2022-11-29 NOTE — PROGRESS NOTES
Pt walked in office complaining of chest pain and shortness of breath on going for over a month now. Pt saw Dr. Jenny Fenton for sob and had a chest CT which was normal.   EKG performed and reviewed with Dr. Wayne Ochoa who states pt in nsr and looks better than the prior ekg. Make follow up with Dr. Veria Duane and go to ER with worsening symptoms. Patient agreed and verbalized understanding.

## 2022-12-01 PROBLEM — Z00.00 MEDICARE ANNUAL WELLNESS VISIT, SUBSEQUENT: Status: RESOLVED | Noted: 2022-11-01 | Resolved: 2022-12-01

## 2022-12-07 ENCOUNTER — OFFICE VISIT (OUTPATIENT)
Dept: CARDIOLOGY CLINIC | Age: 77
End: 2022-12-07
Payer: COMMERCIAL

## 2022-12-07 VITALS
WEIGHT: 170 LBS | SYSTOLIC BLOOD PRESSURE: 148 MMHG | DIASTOLIC BLOOD PRESSURE: 92 MMHG | BODY MASS INDEX: 32.1 KG/M2 | HEART RATE: 60 BPM | HEIGHT: 61 IN

## 2022-12-07 DIAGNOSIS — Z95.1 S/P CABG X 4: ICD-10-CM

## 2022-12-07 DIAGNOSIS — I50.22 CHRONIC SYSTOLIC (CONGESTIVE) HEART FAILURE (HCC): ICD-10-CM

## 2022-12-07 DIAGNOSIS — E11.59 TYPE 2 DIABETES MELLITUS WITH VASCULAR DISEASE (HCC): ICD-10-CM

## 2022-12-07 DIAGNOSIS — I65.23 BILATERAL CAROTID ARTERY STENOSIS: ICD-10-CM

## 2022-12-07 DIAGNOSIS — R07.2 PRECORDIAL PAIN: ICD-10-CM

## 2022-12-07 DIAGNOSIS — I25.119 CORONARY ARTERY DISEASE INVOLVING NATIVE CORONARY ARTERY OF NATIVE HEART WITH ANGINA PECTORIS (HCC): Primary | ICD-10-CM

## 2022-12-07 DIAGNOSIS — R06.09 DYSPNEA ON EXERTION: ICD-10-CM

## 2022-12-07 PROBLEM — I20.0 ACCELERATING ANGINA (HCC): Status: ACTIVE | Noted: 2022-12-07

## 2022-12-07 PROCEDURE — 99215 OFFICE O/P EST HI 40 MIN: CPT | Performed by: INTERNAL MEDICINE

## 2022-12-07 PROCEDURE — 3075F SYST BP GE 130 - 139MM HG: CPT | Performed by: INTERNAL MEDICINE

## 2022-12-07 PROCEDURE — 3078F DIAST BP <80 MM HG: CPT | Performed by: INTERNAL MEDICINE

## 2022-12-07 PROCEDURE — 1123F ACP DISCUSS/DSCN MKR DOCD: CPT | Performed by: INTERNAL MEDICINE

## 2022-12-07 PROCEDURE — 3051F HG A1C>EQUAL 7.0%<8.0%: CPT | Performed by: INTERNAL MEDICINE

## 2022-12-07 NOTE — PROGRESS NOTES
[address], [address] [facility], [address] [place] [address]  PHONE: [phone]     22    NAME:  Carl Banegas  : 1945  MRN: [id number]       SUBJECTIVE:   Carl Banegas is a 65 y.o. female seen for a follow up visit regarding the following:     Chief Complaint   Patient presents with    Coronary Artery Disease     Echo        HPI:    Here for CAD, 4v CABG on 16 by Dr. Obed Medrano. Less than 50% ICAs . Echo 2019: normal EF. Ace cough     NST 2022: There is a moderate severity left ventricular stress perfusion defect that is medium to large in size present in the inferolateral segment(s) that is partially reversible. EF 44%. Echo 2022: low normal EF. She has been walking more, now having worsening exertional chest pressure, more angina now. Angina worsening last few weeks. Patient denies recent history of orthopnea, PND, excessive dizziness and/or syncope. Ace cough after CABG. From [facility], here since . Past Medical History, Past Surgical History, Family history, Social History, and Medications were all reviewed with the patient today and updated as necessary.      Current Outpatient Medications   Medication Sig Dispense Refill    Alum & Mag Hydroxide-Simeth (ANTACID ANTI-GAS PO) Take by mouth      Evolocumab 140 MG/ML SOAJ Inject 140 mg into the skin every 14 days 6 Adjustable Dose Pre-filled Pen Syringe 3    magnesium 30 MG tablet Take 30 mg by mouth 2 times daily      Omega-3 1000 MG CAPS Take by mouth      Ascorbic Acid (VITAMIN C) 250 MG tablet Take 250 mg by mouth daily      Garlic 10 MG CAPS Take by mouth      Potassium 75 MG TABS Take by mouth      ibuprofen (ADVIL;MOTRIN) 200 MG tablet Take 200 mg by mouth every 6 hours as needed for Pain      aspirin 81 MG EC tablet Take 81 mg by mouth daily      ferrous sulfate (IRON 325) 325 (65 Fe) MG tablet Take 325 mg by mouth daily (with breakfast)       No current facility-administered medications for this visit. Allergies   Allergen Reactions    Ace Inhibitors Other (See Comments)    Penicillins Other (See Comments)     Head felt \"swimmy,\" occurred at 22 yrs of age. 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 History of breast cancer 06/17/2016    Dyspnea on exertion 06/17/2016    Chest pain 06/17/2016    GERD (gastroesophageal reflux disease) 06/17/2016    Colon cancer screening 06/17/2016      Past Surgical History:   Procedure Laterality Date    BREAST LUMPECTOMY Left     CARDIAC VALVE SURGERY  2006    CORONARY ARTERY BYPASS GRAFT  7/27/16    x 4 vessels, Dr. Toney Ogden (CERVIX STATUS UNKNOWN)       No family history on file. Social History     Tobacco Use    Smoking status: Never    Smokeless tobacco: Never   Substance Use Topics    Alcohol use: Yes     Alcohol/week: 1.0 standard drink         ROS:    Constitutional:   Negative for fevers and unexplained weight loss. Eyes:   Negative for visual disturbance. ENT:   Negative for significant hearing loss and tinnitus. Respiratory:   Negative for hemoptysis. Cardiovascular:   Negative except as noted in HPI. Gastrointestinal:   Negative for melena and abdominal pain.   Genitourinary:   Negative for hematuria, renal stones. Integumentary:   Negative for rash or non-healing wounds  Hematologic/Lymphatic:   Negative for excessive bleeding hx or clotting disorder. Musculoskeletal:  Negative for active, unexplained/severe joint pain. Neurological:   Negative for stroke. Behavioral/Psych:   Negative for suicidal ideations. Endocrine:   Negative for uncontrolled diabetic symptoms including polyuria, polydipsia and poor wound healing.          PHYSICAL EXAM:     BP (!) 148/92   Pulse 60   Ht 5' 1\" (1.549 m)   Wt 170 lb (77.1 kg)   LMP  (LMP Unknown)   BMI 32.12 kg/m²    General/Constitutional:   Alert and oriented x 3, no acute distress  HEENT:   normocephalic, atraumatic, moist mucous membranes  Neck:   No JVD or carotid bruits bilaterally  Cardiovascular:   regular rate and rhythm, no murmur/rub/gallop appreciated  Pulmonary:   clear to auscultation bilaterally, no respiratory distress  Abdomen:   soft, non-tender, non-distended  Ext:   No sig LE edema bilaterally  Skin:  warm and dry, no obvious rashes seen  Neuro:   no obvious sensory or motor deficits  Psychiatric:   normal mood and affect      Lab Results   Component Value Date/Time     11/01/2022 12:46 PM    K 4.0 11/01/2022 12:46 PM     11/01/2022 12:46 PM    CO2 26 11/01/2022 12:46 PM    BUN 15 11/01/2022 12:46 PM    CREATININE 0.70 11/01/2022 12:46 PM    GLUCOSE 124 11/01/2022 12:46 PM    CALCIUM 9.5 11/01/2022 12:46 PM        Lab Results   Component Value Date    WBC 7.1 11/01/2022    HGB 12.9 11/01/2022    HCT 40.4 11/01/2022    MCV 91.2 11/01/2022     11/01/2022       No results found for: TSHFT4, TSH    Lab Results   Component Value Date    LABA1C 7.2 (H) 11/01/2022     Lab Results   Component Value Date     11/01/2022       Lab Results   Component Value Date    CHOL 226 (H) 11/01/2022    CHOL 262 (H) 08/29/2019     Lab Results   Component Value Date    TRIG 591 (H) 11/01/2022    TRIG 482 (H) 08/29/2019     Lab Results   Component Value Date    HDL 37 (L) 11/01/2022    HDL 38 (L) 08/29/2019     Lab Results   Component Value Date    [address] Not calculated due to elevated triglyceride level 11/01/2022 [address] Comment 08/29/2019     Lab Results   Component Value Date    LABVLDL 118.2 (H) 11/01/2022    LABVLDL Comment 08/29/2019     Lab Results   Component Value Date    CHOLHDLRATIO 6.1 11/01/2022           I have Independently reviewed prior care notes, any ER records available, cardiac testing, labs and results with the patient and before seeing the patient today. Also independently reviewed outside records when available. ASSESSMENT:    Yary Weinstein was seen today for coronary artery disease. 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 HTN:  BP better at home, she has extreme white coat HTN. Need to follow as stressed. 2. CAD: Remain on ASA. Work on diabetes mgmt, lipids mgmt, back on repatha. Worsening angina, GIBBONS, abnormal NST. Plan on [facility]. Plan for Left Heart Catheterization and possible Percutaneous Coronary Intervention (PCI) at [facility] due to Worsening angina within the last 2 mos as described in HPI. Discussed risk of cardiac catheterization and potential PCI with the patient in detail. These risks include, but are not limited to, bleeding, stroke, heart attack, cardiac arrhythmias, allergic reactions, atheroemboli, acute kidney injury and cardiac arrest/death.   Local complications at the site of catheter insertion were also reviewed and discussed. The patient voiced complete understanding about these risks. The patient agrees to proceed with the aforementioned associated risks. Check labs beforehand. 3. HPL:  back on repatha now. 4. Carotid Dz: Re-eval in the future. On ASA now. Patient has been instructed and agrees to call our office with any issues or other concerns related to their cardiac condition(s) and/or complaint(s).         Return for Return After Test.       SANAZ PRADHAN,   12/7/2022

## 2022-12-12 NOTE — PROGRESS NOTES
Patient pre-assessment complete for [facility] scheduled with Dr. Mae Ponce, arrival time 0730. Patient verified using . Patient instructed to bring a list of all home medications on the day of procedure. NPO status reinforced. Patient informed to take a full dose aspirin 325mg  or 81 mg x 4 on the day of procedure. Instructed they can take all other medications excluding vitamins & supplements. Patient verbalizes understanding of all instructions & denies any questions at this time.

## 2022-12-13 ENCOUNTER — HOSPITAL ENCOUNTER (OUTPATIENT)
Age: 77
Setting detail: OUTPATIENT SURGERY
Discharge: HOME | End: 2022-12-13
Attending: INTERNAL MEDICINE | Admitting: INTERNAL MEDICINE
Payer: MEDICARE

## 2022-12-13 VITALS
OXYGEN SATURATION: 96 % | HEART RATE: 67 BPM | SYSTOLIC BLOOD PRESSURE: 140 MMHG | WEIGHT: 169.97 LBS | RESPIRATION RATE: 16 BRPM | BODY MASS INDEX: 32.09 KG/M2 | HEIGHT: 61 IN | DIASTOLIC BLOOD PRESSURE: 86 MMHG

## 2022-12-13 DIAGNOSIS — I20.0 ACCELERATING ANGINA (HCC): ICD-10-CM

## 2022-12-13 PROBLEM — I77.9 BILATERAL CAROTID ARTERY DISEASE (HCC): Chronic | Status: ACTIVE | Noted: 2017-09-18

## 2022-12-13 PROBLEM — E11.59 TYPE 2 DIABETES MELLITUS WITH OTHER CIRCULATORY COMPLICATIONS (HCC): Chronic | Status: RESOLVED | Noted: 2022-11-01 | Resolved: 2022-12-13

## 2022-12-13 PROBLEM — E11.59 TYPE 2 DIABETES MELLITUS WITH OTHER CIRCULATORY COMPLICATIONS (HCC): Chronic | Status: ACTIVE | Noted: 2022-11-01

## 2022-12-13 PROBLEM — I50.22 CHRONIC SYSTOLIC (CONGESTIVE) HEART FAILURE (HCC): Chronic | Status: ACTIVE | Noted: 2022-12-07

## 2022-12-13 LAB
ANION GAP SERPL CALC-SCNC: 6 MMOL/L (ref 2–11)
BASOPHILS # BLD: 0 K/UL (ref 0–0.2)
BASOPHILS NFR BLD: 0 % (ref 0–2)
BUN SERPL-MCNC: 21 MG/DL (ref 8–23)
CALCIUM SERPL-MCNC: 9.2 MG/DL (ref 8.3–10.4)
CHLORIDE SERPL-SCNC: 109 MMOL/L (ref 101–110)
CO2 SERPL-SCNC: 25 MMOL/L (ref 21–32)
CREAT SERPL-MCNC: 0.8 MG/DL (ref 0.6–1)
DIFFERENTIAL METHOD BLD: NORMAL
ECHO BSA: 1.82 M2
EKG ATRIAL RATE: 67 BPM
EKG DIAGNOSIS: NORMAL
EKG P AXIS: 49 DEGREES
EKG P-R INTERVAL: 178 MS
EKG Q-T INTERVAL: 414 MS
EKG QRS DURATION: 94 MS
EKG QTC CALCULATION (BAZETT): 437 MS
EKG R AXIS: 51 DEGREES
EKG T AXIS: 11 DEGREES
EKG VENTRICULAR RATE: 67 BPM
EOSINOPHIL # BLD: 0.2 K/UL (ref 0–0.8)
EOSINOPHIL NFR BLD: 3 % (ref 0.5–7.8)
ERYTHROCYTE [DISTWIDTH] IN BLOOD BY AUTOMATED COUNT: 13.8 % (ref 11.9–14.6)
GLUCOSE SERPL-MCNC: 194 MG/DL (ref 65–100)
HCT VFR BLD AUTO: 41.3 % (ref 35.8–46.3)
HGB BLD-MCNC: 13.5 G/DL (ref 11.7–15.4)
IMM GRANULOCYTES # BLD AUTO: 0 K/UL (ref 0–0.5)
IMM GRANULOCYTES NFR BLD AUTO: 0 % (ref 0–5)
INR PPP: 0.9
LYMPHOCYTES # BLD: 2.7 K/UL (ref 0.5–4.6)
LYMPHOCYTES NFR BLD: 34 % (ref 13–44)
MAGNESIUM SERPL-MCNC: 2.6 MG/DL (ref 1.8–2.4)
MCH RBC QN AUTO: 29.2 PG (ref 26.1–32.9)
MCHC RBC AUTO-ENTMCNC: 32.7 G/DL (ref 31.4–35)
MCV RBC AUTO: 89.4 FL (ref 82–102)
MONOCYTES # BLD: 0.6 K/UL (ref 0.1–1.3)
MONOCYTES NFR BLD: 8 % (ref 4–12)
NEUTS SEG # BLD: 4.5 K/UL (ref 1.7–8.2)
NEUTS SEG NFR BLD: 55 % (ref 43–78)
NRBC # BLD: 0 K/UL (ref 0–0.2)
PLATELET # BLD AUTO: 199 K/UL (ref 150–450)
PMV BLD AUTO: 11.3 FL (ref 9.4–12.3)
POTASSIUM SERPL-SCNC: 4.3 MMOL/L (ref 3.5–5.1)
PROTHROMBIN TIME: 12.9 SEC (ref 12.6–14.3)
RBC # BLD AUTO: 4.62 M/UL (ref 4.05–5.2)
SODIUM SERPL-SCNC: 140 MMOL/L (ref 133–143)
WBC # BLD AUTO: 8.1 K/UL (ref 4.3–11.1)

## 2022-12-13 PROCEDURE — 99152 MOD SED SAME PHYS/QHP 5/>YRS: CPT | Performed by: INTERNAL MEDICINE

## 2022-12-13 PROCEDURE — 6360000002 HC RX W HCPCS: Performed by: INTERNAL MEDICINE

## 2022-12-13 PROCEDURE — C1894 INTRO/SHEATH, NON-LASER: HCPCS | Performed by: INTERNAL MEDICINE

## 2022-12-13 PROCEDURE — 80048 BASIC METABOLIC PNL TOTAL CA: CPT

## 2022-12-13 PROCEDURE — 83735 ASSAY OF MAGNESIUM: CPT

## 2022-12-13 PROCEDURE — 93459 L HRT ART/GRFT ANGIO: CPT | Performed by: INTERNAL MEDICINE

## 2022-12-13 PROCEDURE — C1760 CLOSURE DEV, VASC: HCPCS | Performed by: INTERNAL MEDICINE

## 2022-12-13 PROCEDURE — C1769 GUIDE WIRE: HCPCS | Performed by: INTERNAL MEDICINE

## 2022-12-13 PROCEDURE — 85610 PROTHROMBIN TIME: CPT

## 2022-12-13 PROCEDURE — 93005 ELECTROCARDIOGRAM TRACING: CPT | Performed by: INTERNAL MEDICINE

## 2022-12-13 PROCEDURE — 85025 COMPLETE CBC W/AUTO DIFF WBC: CPT

## 2022-12-13 PROCEDURE — 2580000003 HC RX 258: Performed by: INTERNAL MEDICINE

## 2022-12-13 PROCEDURE — 99153 MOD SED SAME PHYS/QHP EA: CPT | Performed by: INTERNAL MEDICINE

## 2022-12-13 PROCEDURE — 2500000003 HC RX 250 WO HCPCS: Performed by: INTERNAL MEDICINE

## 2022-12-13 PROCEDURE — 6370000000 HC RX 637 (ALT 250 FOR IP): Performed by: INTERNAL MEDICINE

## 2022-12-13 PROCEDURE — 6360000004 HC RX CONTRAST MEDICATION: Performed by: INTERNAL MEDICINE

## 2022-12-13 PROCEDURE — 2709999900 HC NON-CHARGEABLE SUPPLY: Performed by: INTERNAL MEDICINE

## 2022-12-13 RX ORDER — SODIUM CHLORIDE 9 MG/ML
INJECTION, SOLUTION INTRAVENOUS CONTINUOUS
Status: DISCONTINUED | OUTPATIENT
Start: 2022-12-13 | End: 2022-12-13 | Stop reason: HOSPADM

## 2022-12-13 RX ORDER — DIAZEPAM 5 MG/1
5 TABLET ORAL ONCE
Status: COMPLETED | OUTPATIENT
Start: 2022-12-13 | End: 2022-12-13

## 2022-12-13 RX ORDER — LIDOCAINE HYDROCHLORIDE 10 MG/ML
INJECTION, SOLUTION INFILTRATION; PERINEURAL PRN
Status: DISCONTINUED | OUTPATIENT
Start: 2022-12-13 | End: 2022-12-13 | Stop reason: HOSPADM

## 2022-12-13 RX ORDER — HEPARIN SODIUM 200 [USP'U]/100ML
INJECTION, SOLUTION INTRAVENOUS CONTINUOUS PRN
Status: COMPLETED | OUTPATIENT
Start: 2022-12-13 | End: 2022-12-13

## 2022-12-13 RX ORDER — ASPIRIN 81 MG/1
324 TABLET, CHEWABLE ORAL ONCE
Status: DISCONTINUED | OUTPATIENT
Start: 2022-12-13 | End: 2022-12-13 | Stop reason: HOSPADM

## 2022-12-13 RX ORDER — MIDAZOLAM HYDROCHLORIDE 1 MG/ML
INJECTION INTRAMUSCULAR; INTRAVENOUS PRN
Status: DISCONTINUED | OUTPATIENT
Start: 2022-12-13 | End: 2022-12-13 | Stop reason: HOSPADM

## 2022-12-13 RX ORDER — NITROGLYCERIN 20 MG/100ML
INJECTION INTRAVENOUS CONTINUOUS PRN
Status: DISCONTINUED | OUTPATIENT
Start: 2022-12-13 | End: 2022-12-13 | Stop reason: HOSPADM

## 2022-12-13 RX ADMIN — DIAZEPAM 5 MG: 5 TABLET ORAL at 08:10

## 2022-12-13 RX ADMIN — SODIUM CHLORIDE: 900 INJECTION, SOLUTION INTRAVENOUS at 08:11

## 2022-12-13 NOTE — PROGRESS NOTES
Discharge instructions given per orders, voiced good understanding of R groin care, medications & follow up care.  Denies any questions

## 2022-12-13 NOTE — PROGRESS NOTES
Assisted OOB & ambulated to BR & on unit. Tolerated activity without difficulty.  R groin without bleeding or hematoma

## 2022-12-13 NOTE — PROGRESS NOTES
HOB elevated, R groin site remains CDI. Pt provided with meal tray and drink. Call light within reach, no other needs at this time.

## 2022-12-13 NOTE — Clinical Note
Aspirin Registry Question:   Aspirin was given prior to the procedure.    [email or web address]

## 2022-12-13 NOTE — H&P
Office Visit  2022  [facility] CARDIOLOGY  Delia Browning DO  Internal Medicine Cardiovascular Disease Coronary artery disease involving native coronary artery of native heart with angina pectoris ([address] +6 more  Dx Coronary Artery Disease ; Referred by Delia Browning DO  Reason for Visit     Progress Notes  Delia Browning DO (Physician)   Internal Medicine Cardiovascular Disease  Expand All Collapse All        [address], [address]  [facility], [address] [place] [address]  PHONE: [phone]                22     NAME:  Jenna Acevedo  : 1945  MRN: [id number]         SUBJECTIVE:   Jenna Acevedo is a 65 y.o. female seen for a follow up visit regarding the following:           Chief Complaint   Patient presents with    Coronary Artery Disease       Echo          HPI:    Here for CAD, 4v CABG on 16 by Dr. Zak Price. Less than 50% ICAs . Echo 2019: normal EF. Ace cough     NST 2022: There is a moderate severity left ventricular stress perfusion defect that is medium to large in size present in the inferolateral segment(s) that is partially reversible. EF 44%. Echo 2022: low normal EF. She has been walking more, now having worsening exertional chest pressure, more angina now. Angina worsening last few weeks. Patient denies recent history of orthopnea, PND, excessive dizziness and/or syncope. 4-vessel coronary artery bypass grafting   using reverse saphenous vein graft to the diagonal coronary,   saphenous vein graft to the intermediate coronary, saphenous   vein graft to the posterior descending coronary, and left   internal mammary artery to the left anterior descending   coronary      Ace cough after CABG. [facility] here since . Past Medical History, Past Surgical History, Family history, Social History, and Medications were all reviewed with the patient today and updated as necessary.       Current Facility-Administered Medications          Current Outpatient Medications   Medication Sig Dispense Refill    Alum & Mag Hydroxide-Simeth (ANTACID ANTI-GAS PO) Take by mouth        Evolocumab 140 MG/ML SOAJ Inject 140 mg into the skin every 14 days 6 Adjustable Dose Pre-filled Pen Syringe 3    magnesium 30 MG tablet Take 30 mg by mouth 2 times daily        Omega-3 1000 MG CAPS Take by mouth        Ascorbic Acid (VITAMIN C) 250 MG tablet Take 250 mg by mouth daily        Garlic 10 MG CAPS Take by mouth        Potassium 75 MG TABS Take by mouth        ibuprofen (ADVIL;MOTRIN) 200 MG tablet Take 200 mg by mouth every 6 hours as needed for Pain        aspirin 81 MG EC tablet Take 81 mg by mouth daily        ferrous sulfate (IRON 325) 325 (65 Fe) MG tablet Take 325 mg by mouth daily (with breakfast)          No current facility-administered medications for this visit. Allergies   Allergen Reactions    Ace Inhibitors Other (See Comments)    Penicillins Other (See Comments)       Head felt \"swimmy,\" occurred at 22 yrs of age. 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           History of breast cancer 06/17/2016    Dyspnea on exertion 06/17/2016    Chest pain 06/17/2016    GERD (gastroesophageal reflux disease) seen  Neuro:   no obvious sensory or motor deficits  Psychiatric:   normal mood and affect              Lab Results   Component Value Date/Time      11/01/2022 12:46 PM     K 4.0 11/01/2022 12:46 PM      11/01/2022 12:46 PM     CO2 26 11/01/2022 12:46 PM     BUN 15 11/01/2022 12:46 PM     CREATININE 0.70 11/01/2022 12:46 PM     GLUCOSE 124 11/01/2022 12:46 PM     CALCIUM 9.5 11/01/2022 12:46 PM               Lab Results   Component Value Date     WBC 7.1 11/01/2022     HGB 12.9 11/01/2022     HCT 40.4 11/01/2022     MCV 91.2 11/01/2022      11/01/2022         No results found for: TSHFT4, TSH           Lab Results   Component Value Date     LABA1C 7.2 (H) 11/01/2022            Lab Results   Component Value Date      11/01/2022               Lab Results   Component Value Date     CHOL 226 (H) 11/01/2022     CHOL 262 (H) 08/29/2019            Lab Results   Component Value Date     TRIG 591 (H) 11/01/2022     TRIG 482 (H) 08/29/2019            Lab Results   Component Value Date     HDL 37 (L) 11/01/2022     HDL 38 (L) 08/29/2019            Lab Results   Component Value Date     [address] Not calculated due to elevated triglyceride level 11/01/2022     LDLCALC Comment 08/29/2019            Lab Results   Component Value Date     LABVLDL 118.2 (H) 11/01/2022     LABVLDL Comment 08/29/2019            Lab Results   Component Value Date     CHOLHDLRATIO 6.1 11/01/2022               I have Independently reviewed prior care notes, any ER records available, cardiac testing, labs and results with the patient and before seeing the patient today. Also independently reviewed outside records when available. ASSESSMENT:     Jocelin Saeed was seen today for coronary artery disease. 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 HTN:  BP better at home, she has extreme white coat HTN. Need to follow as stressed. 2. CAD: Remain on ASA. Work on diabetes mgmt, lipids mgmt, back on repatha. Worsening angina, GIBBONS, abnormal NST. Plan on [facility]. Plan for Left Heart Catheterization and possible Percutaneous Coronary Intervention (PCI) at [facility] due to Worsening angina within the last 2 mos as described in HPI. Discussed risk of cardiac catheterization and potential PCI with the patient in detail. These risks include, but are not limited to, bleeding, stroke, heart attack, cardiac arrhythmias, allergic reactions, atheroemboli, acute kidney injury and cardiac arrest/death. Local complications at the site of catheter insertion were also reviewed and discussed. The patient voiced complete understanding about these risks. The patient agrees to proceed with the aforementioned associated risks. Check labs beforehand. 3. HPL:  back on repatha now. 4. Carotid Dz: Re-eval in the future. On ASA now. Patient has been instructed and agrees to call our office with any issues or other concerns related to their cardiac condition(s) and/or complaint(s).           Return for Return After Test.         SANAZ PRADHAN DO  12/7/2022           Instructions      Return for Return After Test.  AVS (Printed 12/7/2022)  Additional Documentation    Vitals:  /92 Important    Pulse 60  Ht 5' 1\" (1.549 m)  Wt 170 lb (77.1 kg)  LMP  (LMP Unknown)  BMI 32.12 kg/m²  BSA 1.82 m²  Pain Sc   0 - No pain    More Vitals   Flowsheets:  Calorie Assessment     Encounter Info: Billing Info,  Allergies,  Detailed Report,  History,  Medications,  Questionnaires       Communications    View Encounter Conversation Summary  Chart Review Routing History    No routing history on file.   Encounter Status    Signed by Nydia Ramos DO on 12/7/22 at 10:55  BestPractice Advisories    Click to view BestPractice Advisory history     Encounter Messages    No messages in this encounter     Linked Episodes    LEFT HEART CATH / CORONA Noted 12/7/2022    Orders Placed    Basic Metabolic Panel  CBC  Case Request Cardiac Cath Lab  Outpatient Medications at End of Encounter as of 12/7/2022    Alum & Mag Hydroxide-Simeth (ANTACID ANTI-GAS PO) (Taking) Take by mouth   Evolocumab 140 MG/ML SOAJ (Taking) Inject 140 mg into the skin every 14 days   magnesium 30 MG tablet (Taking) Take 30 mg by mouth 2 times daily   Omega-3 1000 MG CAPS (Taking) Take by mouth   Ascorbic Acid (VITAMIN C) 250 MG tablet (Taking) Take 250 mg by mouth daily   Garlic 10 MG CAPS (Taking) Take by mouth   Potassium 75 MG TABS (Taking) Take by mouth   ibuprofen (ADVIL;MOTRIN) 200 MG tablet (Taking) Take 200 mg by mouth every 6 hours as needed for Pain   aspirin 81 MG EC tablet (Taking) Take 81 mg by mouth daily   ferrous sulfate (IRON 325) 325 (65 Fe) MG tablet (Taking) Take 325 mg by mouth daily (with breakfast)     Medication Changes      None     Medication List     Visit Diagnoses      Coronary artery disease involving native coronary artery of native heart with angina pectoris (HCC)    Chronic systolic (congestive) heart failure    Bilateral carotid artery stenosis    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

## 2022-12-13 NOTE — PROGRESS NOTES
I scheduled this patient to see Byron Ferguson for his first available appt which isnt until the end of January. The patient had a LHC today. CV surgery was consulted for redo CABG and the patient has decided against surgery. She will need to be medically managed. She will need to be seen within a week to decide on new medications. Pt prefers to see Byron Ferguson or for him to decide medications and call her, if not, you can schedule her with whoever. Pt is being discharged now so please reach out to patient with new appt with 7-10 days. Thanks! Above message sent to scheduling at discharge.

## 2022-12-13 NOTE — DISCHARGE INSTRUCTIONS
HEART CATHETERIZATION/ANGIOGRAPHY DISCHARGE INSTRUCTIONS    Check puncture site frequently for swelling or bleeding. If there is any bleeding, apply pressure over the area with a clean towel or washcloth and call 911. Notify your doctor for any redness, swelling, drainage, or oozing from the puncture site. Notify your doctor for any fever or chills. If the extremity becomes cold, numb, or painful call [facility] Cardiology  Activity should be limited for the next 48 hours. No heavy lifting, pushing, pulling  or strenuous activity for 48 hours. No heavy lifting (anything over 10 pounds) for 3 days. You may resume your usual diet. Drink more fluids than usual.  Have a responsible person drive you home and stay with you for at least 24 hours after your heart catheterization/angiography. You may remove bandage from your R groin in 24 hours. You may shower in 24 hours. No tub baths, hot tubs, or swimming for 1 week. Do not place any lotions, creams, powders, or ointments over puncture site for 1 week. You may place a clean band-aid over the puncture site each day for 5 days. Change daily. Sedation for a Medical Procedure: Care Instructions     You were given a sedative medication during your visit. While many of the effects will have worn   off before you leave; you may continue to feel some effects for several hours. Common side effects from sedation include:  Feeling sleepy. (Your doctors and nurses will make sure you are not too sleepy to go home.)  Nausea and vomiting. This usually does not last long. Feeling tired. How can you care for yourself at home? Activity    Don't do anything for 24 hours that requires attention to detail. It takes time for the medicine effects to completely wear off. Do not make important legal decisions for 24 hours. Do not sign any legal documents for 24 hours.      Do not drink alcohol today     For your safety, you should not drive or operate heavy machinery for the remainder of the day     Rest when you feel tired. Getting enough sleep will help you recover. Diet    You can eat your normal diet, unless your doctor gives you other instructions. If your stomach is upset, try clear liquids and bland, low-fat foods like plain toast or rice. Drink plenty of fluids (unless your doctor tells you not to). Don't drink alcohol for 24 hours. Medicines    Be safe with medicines. Read and follow all instructions on the label. If the doctor gave you a prescription medicine for pain, take it as prescribed. If you are not taking a prescription pain medicine, ask your doctor if you can take an over-the-counter medicine. If you think your pain medicine is making you sick to your stomach: Take your medicine after meals (unless your doctor has told you not to). Ask your doctor for a different pain medicine. I have read the above instructions and have had the opportunity to ask questions.       Patient: ________________________   Date: _____________    Witness: _______________________   Date: _____________

## 2022-12-13 NOTE — PROGRESS NOTES
TRANSFER - IN REPORT:    Verbal report received from Enmanuel Chavarria RN on Info Assembly being received from [facility] for routine post-op      Report consisted of patients Situation, Background, Assessment and Recommendations(SBAR). Information from the following report(s) Procedure Summary was reviewed with the receiving nurse. Opportunity for questions and clarification was provided. Assessment completed upon patients arrival to unit and care assumed.

## 2022-12-13 NOTE — Clinical Note
Multiple views of the saphenous vein graft to the right coronary artery obtained using power injection.

## 2022-12-13 NOTE — PROGRESS NOTES
TRANSFER - OUT REPORT:    Verbal report given to Montrell Mask, RN on Mancel Folds  being transferred to [facility] for routine progression of patient care       Report consisted of patient's Situation, Background, Assessment and   Recommendations(SBAR). Information from the following report(s) Nurse Handoff Report was reviewed with the receiving nurse.     [facility] with Dr Homero Aelxander  No interventions  R Femoral  Closed with 5fr vascade  Covered with tegaderm  Versed 2mg

## 2022-12-13 NOTE — CONSULTS
Roxi Jordan. MD Garrison Nazario. Fernando Yang MD           CONSULT NOTE    Ethan Melchor         12/7/2022 1945    REFERRING PHYSICIAN:  Dr. Lenore Norman:  The patient is a 65 y.o. female who was admitted for Accelerating angina ([facility] [I20.0]. She was recently evaluated by cardiology for worsening chest pain especially over the past several weeks. She had a stress test in September that showed a moderately severe left ventricular stress perfusion defect that is medium to large in size present in the inferolateral segment(s) that is partially reversible. The EF is 44%. She underwent LHC on 12/12/22 which showed severe coronary artery disease involving the native LAD ramus and circumflex and right coronary artery the LAD and right coronary artery completely occluded the ramus and circumflex have severe disease. Ejection fraction reduced at 40 to 45%. She had a previous CABG by  in 2016. She is on [place] and Evolocumab. She reports taking the Evolocumab for about 6 months but then being off of it for about 2 years (not refilled during [facility]). It was restarted 3 months ago. She cannot take ACE inhibitors due to cough. Risk factors include HTN, dyslipidemia. She does not take medication for her blood pressure as it is episodic and noted just with medical appointments. She has no history of stroke, TIA, prior vascular surgery, anesthetic complication, lung disease, DVT or PE, GI bleeding     Pt is on Plavix/Brilinta/Effient?  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 Emre Hurd (CERVIX STATUS UNKNOWN)         No family history on file. Social History     Socioeconomic History    Marital status:      Spouse name: Not on file    Number of children: Not on file    Years of education: Not on file    Highest education level: Not on file   Occupational History    Not on file   Tobacco Use    Smoking status: Never    Smokeless tobacco: Never   Vaping Use    Vaping Use: Never used   Substance and Sexual Activity    Alcohol use: Yes     Alcohol/week: 1.0 standard drink    Drug use: No    Sexual activity: Not on file   Other Topics Concern    Not on file   Social History Narrative     and lives alone. Retired. Previously worked as CNA. She is originally from [place]. Social Determinants of Health     Financial Resource Strain: Not on file   Food Insecurity: Not on file   Transportation Needs: Not on file   Physical Activity: Not on file   Stress: Not on file   Social Connections: Not on file   Intimate Partner Violence: Not on file   Housing Stability: Not on file       Allergies   Allergen Reactions    Ace Inhibitors Other (See Comments)    Penicillins Other (See Comments)     Head felt \"swimmy,\" occurred at 22 yrs of age. No current facility-administered medications on file prior to encounter.      Current Outpatient Medications on File Prior to Encounter   Medication Sig Dispense Refill    Alum & Mag Hydroxide-Simeth (ANTACID ANTI-GAS PO) Take by mouth      Evolocumab 140 MG/ML SOAJ Inject 140 mg into the skin every 14 days 6 Adjustable Dose Pre-filled Pen Syringe 3    magnesium 30 MG tablet Take 30 mg by mouth 2 times daily      Omega-3 1000 MG CAPS Take by mouth      Ascorbic Acid (VITAMIN C) 250 MG tablet Take 250 mg by mouth daily      Garlic 10 MG CAPS Take by mouth      Potassium 75 MG TABS Take by mouth      ibuprofen (ADVIL;MOTRIN) 200 MG tablet Take 200 mg by mouth every 6 hours as needed for Pain      aspirin 81 MG EC tablet Take 81 mg by mouth daily      ferrous sulfate (IRON 325) 325 (65 Fe) MG tablet Take 325 mg by mouth daily (with breakfast)         REVIEW OF SYSTEMS:  Review of Systems - History obtained from the patient and her POASandra  General ROS: negative  Hematological and Lymphatic ROS: negative  Endocrine ROS: positive for a history of \"borderline diabetes\" but she required no treatment  Respiratory ROS: no cough, shortness of breath, or wheezing  Cardiovascular ROS: positive for - chest pain  Gastrointestinal ROS: positive for reflux. She has an appt with GI in January for EGD. She has taken Pepcid and pepto bismal for relief  Genito-Urinary ROS: no dysuria, trouble voiding, or hematuria  Musculoskeletal ROS: negative  Neurological ROS: no TIA or stroke symptoms     Physical Exam  Vitals:    12/13/22 0806 12/13/22 0922 12/13/22 1035   BP: (!) 210/105  (!) 162/84   Pulse: 85  69   Resp: 16     TempSrc: Oral     SpO2: 98% 100% 96%   Weight: 169 lb 15.6 oz (77.1 kg)     Height: 5' 1\" (1.549 m)         Physical Exam:  General: Well Developed, Well Nourished, No Acute Distress  HEENT: Normocephalic, pupils equal and round, no scleral icterus  Neck: supple, no JVD, no carotid bruits, no thyromegaly or adenopathy  Chest wall: No deformity  Heart: S1S2 with RRR without murmurs or gallops  Lungs: Clear throughout auscultation bilaterally without adventitious sounds  Vascular: Pulses are full and equal. There is no venous stasis disease.   Abd: soft, nontender, nondistended, with good bowel sounds, no pulsatile masses  Ext: warm, no edema, calves supple/nontender, pulses 2+ bilaterally  Skin: warm and dry, no rashes, cyanosis, jaundice, ecchymoses or evidence of skin breakdown  Psychiatric: Normal mood and affect  Neurologic: Alert and oriented X 3, no focal deficit noted    Labs:   Recent Labs     12/13/22  0803      K 4.3   MG 2.6*   BUN 21   WBC 8.1   HGB 13.5   HCT 41.3      INR 0.9       Lab Results   Component Value Date/Time    CHOL 226 11/01/2022 12:46 PM    HDL 37 11/01/2022 12:46 PM       The patient has NYHA Class 2B symptoms. The mortality risk is 3.3%      In this split/shared patient encounter, I personally reviewed the patient's past medical history, conducted a current medical history and review of systems, and conducted a medically appropriate physical exam. I reviewed the available labs, prior imaging, and current medication list.     PADMINI Gaitan - CNP-C    Assessment:     Active Hospital Problems    *Accelerating angina [facility]) - [facility] today with severe, multivessel disease with occlusion of grafts. CABG has not been recommended. Patient was interviewed with her Seferino Brantley. She, at present, states that she does not want to undergo another bypass surgery. Pt has patent ZEYAD to LAD and small collateral Branches to normal LV. I think she can be improved with medical management. Risk of second open heart operation would be significant in this elderly frail lady         HTN: noted with hospitalization. She is currently not on any tx and has a history of cough with ACE use. Will need to monitor and initiate tx if needed       Hyperlipidemia - has been back on Evolocumab for about 3 months but was off of tx for about 2 years (during [place]). Plan:     Felicia Archer will be shown the preoperative teaching film that thoroughly discusses procedure, risks, and possible complications if she wishes to see.   Risks, benefits, and alternatives were discussed to include, but not limited to:     Bleeding  Arrhythmia   Infection including mediastinitis  Myocardial infarction  Need for reoperation  Renal failure  Respiratory failure  Stroke  Potential death         She may opt for medical therapy. In this split/shared patient encounter, I personally viewed the cardiac catheterization films and echocardiogram. I reviewed the current and past patient medical history obtained by Chitra Mars NP. I reviewed the available labs and current medication list. I discussed the cardiac catheterization and echocardiogram results and educated the patient/family on treatment options. I ordered additional tests and procedures as indicated and coordinated care for the selected treatment plan, which accounted for greater than 50% of the total 14 minutes of clinical time.

## 2022-12-13 NOTE — PROGRESS NOTES
Patient received to [address] [place] [address] room # 14  Ambulatory from [facility]. Patient scheduled for C today with Dr Chucky Camejo. Procedure reviewed & questions answered, voiced good understanding consent obtained & placed on chart. All medications and medical history reviewed. Will prep patient per orders. Patient & family updated on plan of care. The patient has a fraility score of 3-MANAGING WELL, based on ability to ambulate independently.   Patient took Aspirin 324mg today at 0700 prior to arrival.

## 2022-12-21 ENCOUNTER — OFFICE VISIT (OUTPATIENT)
Dept: CARDIOLOGY CLINIC | Age: 77
End: 2022-12-21
Payer: MEDICARE

## 2022-12-21 VITALS
SYSTOLIC BLOOD PRESSURE: 136 MMHG | DIASTOLIC BLOOD PRESSURE: 90 MMHG | BODY MASS INDEX: 32.47 KG/M2 | HEIGHT: 61 IN | WEIGHT: 172 LBS | HEART RATE: 60 BPM

## 2022-12-21 DIAGNOSIS — I65.23 BILATERAL CAROTID ARTERY STENOSIS: Chronic | ICD-10-CM

## 2022-12-21 DIAGNOSIS — I25.118 CORONARY ARTERY DISEASE OF NATIVE ARTERY OF NATIVE HEART WITH STABLE ANGINA PECTORIS (HCC): Primary | Chronic | ICD-10-CM

## 2022-12-21 DIAGNOSIS — I50.22 CHRONIC SYSTOLIC (CONGESTIVE) HEART FAILURE (HCC): Chronic | ICD-10-CM

## 2022-12-21 DIAGNOSIS — Z95.1 S/P CABG X 4: Chronic | ICD-10-CM

## 2022-12-21 DIAGNOSIS — E78.00 PURE HYPERCHOLESTEROLEMIA: Chronic | ICD-10-CM

## 2022-12-21 DIAGNOSIS — I10 ESSENTIAL HYPERTENSION WITH GOAL BLOOD PRESSURE LESS THAN 140/90: Chronic | ICD-10-CM

## 2022-12-21 PROCEDURE — 3078F DIAST BP <80 MM HG: CPT | Performed by: INTERNAL MEDICINE

## 2022-12-21 PROCEDURE — G8427 DOCREV CUR MEDS BY ELIG CLIN: HCPCS | Performed by: INTERNAL MEDICINE

## 2022-12-21 PROCEDURE — 1090F PRES/ABSN URINE INCON ASSESS: CPT | Performed by: INTERNAL MEDICINE

## 2022-12-21 PROCEDURE — 1036F TOBACCO NON-USER: CPT | Performed by: INTERNAL MEDICINE

## 2022-12-21 PROCEDURE — G8484 FLU IMMUNIZE NO ADMIN: HCPCS | Performed by: INTERNAL MEDICINE

## 2022-12-21 PROCEDURE — 1123F ACP DISCUSS/DSCN MKR DOCD: CPT | Performed by: INTERNAL MEDICINE

## 2022-12-21 PROCEDURE — 3074F SYST BP LT 130 MM HG: CPT | Performed by: INTERNAL MEDICINE

## 2022-12-21 PROCEDURE — 99214 OFFICE O/P EST MOD 30 MIN: CPT | Performed by: INTERNAL MEDICINE

## 2022-12-21 PROCEDURE — G8417 CALC BMI ABV UP PARAM F/U: HCPCS | Performed by: INTERNAL MEDICINE

## 2022-12-21 PROCEDURE — G8400 PT W/DXA NO RESULTS DOC: HCPCS | Performed by: INTERNAL MEDICINE

## 2022-12-21 RX ORDER — ISOSORBIDE MONONITRATE 30 MG/1
30 TABLET, EXTENDED RELEASE ORAL DAILY
Qty: 90 TABLET | Refills: 3 | Status: SHIPPED | OUTPATIENT
Start: 2022-12-21

## 2022-12-21 NOTE — PROGRESS NOTES
[address], [address] [place] [facility], [address] [place] [address]  PHONE: [phone]     22    NAME:  Ileana Apple  : 1945  MRN: [id number]       SUBJECTIVE:   Ileana Apple is a 65 y.o. female seen for a follow up visit regarding the following:     Chief Complaint   Patient presents with    Coronary Artery Disease     Post cath        HPI:    Here for CAD, 4v CABG on 16 by Dr. Chelsi Yap. Less than 50% ICAs . Echo 2022: low normal EF. LHC 2022: severe CAD, redo CABG recommended    She is not wanting another CABG. She has some GIBBONS, no CP. Some lack of energy. She has been walking more. Patient denies recent history of orthopnea, PND, excessive dizziness and/or syncope. Ace cough after CABG. From [facility], here since . Past Medical History, Past Surgical History, Family history, Social History, and Medications were all reviewed with the patient today and updated as necessary. Current Outpatient Medications   Medication Sig Dispense Refill    isosorbide mononitrate (IMDUR) 30 MG extended release tablet Take 1 tablet by mouth daily 90 tablet 3    Evolocumab 140 MG/ML SOAJ Inject 140 mg into the skin every 14 days 6 Adjustable Dose Pre-filled Pen Syringe 3    Alum & Mag Hydroxide-Simeth (ANTACID ANTI-GAS PO) Take by mouth      magnesium 30 MG tablet Take 30 mg by mouth 2 times daily      Omega-3 1000 MG CAPS Take by mouth      Ascorbic Acid (VITAMIN C) 250 MG tablet Take 250 mg by mouth daily      Garlic 10 MG CAPS Take by mouth      Potassium 75 MG TABS Take by mouth      ibuprofen (ADVIL;MOTRIN) 200 MG tablet Take 200 mg by mouth every 6 hours as needed for Pain      aspirin 81 MG EC tablet Take 81 mg by mouth daily      ferrous sulfate (IRON 325) 325 (65 Fe) MG tablet Take 325 mg by mouth daily (with breakfast)       No current facility-administered medications for this visit.         Allergies   Allergen Reactions    Ace Inhibitors Other (See Comments)    Penicillins Other (See Comments)     Head felt \"swimmy,\" occurred at 22 yrs of age. 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 History of breast cancer 06/17/2016    Dyspnea on exertion 06/17/2016    GERD (gastroesophageal reflux disease) 06/17/2016    Colon cancer screening 06/17/2016      Past Surgical History:   Procedure Laterality Date    BREAST LUMPECTOMY Left     CARDIAC PROCEDURE N/A 12/13/2022    LEFT HEART CATH / CORONARY ANGIOGRAPHY W GRAFTS performed by Lona Gregorio MD at [place] [address]  2006    CORONARY ARTERY BYPASS GRAFT  7/27/16    x 4 vessels, Dr. Capone Person (CERVIX STATUS UNKNOWN)       History reviewed. No pertinent family history. Social History     Tobacco Use    Smoking status: Never    Smokeless tobacco: Never   Substance Use Topics    Alcohol use:  Yes     Alcohol/week: 1.0 standard drink         ROS:    No obvious pertinent positives on review of systems except for what was outlined in the HPI today.       PHYSICAL EXAM:     BP (!) 136/90   Pulse 60   Ht 5' 1\" (1.549 m)   Wt 172 lb (78 kg)   LMP  (LMP Unknown)   BMI 32.50 kg/m²    General/Constitutional:   Alert and oriented x 3, no acute distress  HEENT:   normocephalic, atraumatic, moist mucous membranes  Neck:   No JVD or carotid bruits bilaterally  Cardiovascular:   regular rate and rhythm, no murmur/rub/gallop appreciated  Pulmonary:   clear to auscultation bilaterally, no respiratory distress  Abdomen:   soft, non-tender, non-distended  Ext:   No sig LE edema bilaterally  Skin:  warm and dry, no obvious rashes seen  Neuro:   no obvious sensory or motor deficits  Psychiatric:   normal mood and affect      Lab Results   Component Value Date/Time     12/13/2022 08:03 AM    K 4.3 12/13/2022 08:03 AM     12/13/2022 08:03 AM    CO2 25 12/13/2022 08:03 AM    BUN 21 12/13/2022 08:03 AM    CREATININE 0.80 12/13/2022 08:03 AM    GLUCOSE 194 12/13/2022 08:03 AM    CALCIUM 9.2 12/13/2022 08:03 AM        Lab Results   Component Value Date    WBC 8.1 12/13/2022    HGB 13.5 12/13/2022    HCT 41.3 12/13/2022    MCV 89.4 12/13/2022     12/13/2022       No results found for: TSHFT4, TSH    Lab Results   Component Value Date    LABA1C 7.2 (H) 11/01/2022     Lab Results   Component Value Date     11/01/2022       Lab Results   Component Value Date    CHOL 226 (H) 11/01/2022    CHOL 262 (H) 08/29/2019     Lab Results   Component Value Date    TRIG 591 (H) 11/01/2022    TRIG 482 (H) 08/29/2019     Lab Results   Component Value Date    HDL 37 (L) 11/01/2022    HDL 38 (L) 08/29/2019     Lab Results   Component Value Date    [address] Not calculated due to elevated triglyceride level 11/01/2022    LDLCALC Comment 08/29/2019     Lab Results   Component Value Date    LABVLDL 118.2 (H) 11/01/2022    LABVLDL Comment 08/29/2019     Lab Results   Component Value Date    CHOLHDLRATIO 6.1 11/01/2022           I have Independently reviewed prior care notes, any ER records available, cardiac testing, labs and results with the patient and before seeing the patient today. Also independently reviewed outside records when available. ASSESSMENT:    Pankaj Olson was seen today for coronary artery disease. Diagnoses and all orders for this visit:    Coronary artery disease of native artery of native heart with stable angina pectoris (HCC)    Chronic systolic (congestive) heart failure    Essential hypertension with goal blood pressure less than 140/90    Bilateral carotid artery stenosis    S/P CABG x 4    Pure hypercholesterolemia    Other orders  -     isosorbide mononitrate (IMDUR) 30 MG extended release tablet; Take 1 tablet by mouth daily  -     Evolocumab 140 MG/ML SOAJ; Inject 140 mg into the skin every 14 days        PLAN:     1. HTN:  BP better at home, she has extreme white coat HTN. Need to follow as stressed. 2. CAD: Remain on ASA. Work on diabetes mgmt, lipids mgmt, back on repatha. Severe CAD, declining CABG. Declining cardiac rehab. SCD risk known. Trial of imdur. 3. HPL:  back on repatha now. 4. Carotid Dz:  no more imaging needed now. Patient has been instructed and agrees to call our office with any issues or other concerns related to their cardiac condition(s) and/or complaint(s). Return in about 3 months (around 3/21/2023).        SANAZ PRADHAN,   12/21/2022

## 2023-01-03 ENCOUNTER — TRANSCRIBE ORDERS (OUTPATIENT)
Dept: SCHEDULING | Age: 78
End: 2023-01-03

## 2023-01-03 DIAGNOSIS — R13.19 OTHER DYSPHAGIA: Primary | ICD-10-CM

## 2023-01-23 ENCOUNTER — HOSPITAL ENCOUNTER (OUTPATIENT)
Dept: GENERAL RADIOLOGY | Age: 78
Discharge: HOME | End: 2023-01-26
Payer: MEDICARE

## 2023-01-23 DIAGNOSIS — R13.19 OTHER DYSPHAGIA: ICD-10-CM

## 2023-01-23 PROCEDURE — 2500000003 HC RX 250 WO HCPCS: Performed by: NURSE PRACTITIONER

## 2023-01-23 PROCEDURE — A4641 RADIOPHARM DX AGENT NOC: HCPCS | Performed by: NURSE PRACTITIONER

## 2023-01-23 PROCEDURE — 74220 X-RAY XM ESOPHAGUS 1CNTRST: CPT

## 2023-01-23 PROCEDURE — 6370000000 HC RX 637 (ALT 250 FOR IP): Performed by: NURSE PRACTITIONER

## 2023-01-23 PROCEDURE — 6360000004 HC RX CONTRAST MEDICATION: Performed by: NURSE PRACTITIONER

## 2023-01-23 RX ADMIN — BARIUM SULFATE 355 ML: 0.6 SUSPENSION ORAL at 10:14

## 2023-01-23 RX ADMIN — BARIUM SULFATE 1 TABLET: 700 TABLET ORAL at 10:13

## 2023-01-23 RX ADMIN — BARIUM SULFATE 140 ML: 980 POWDER, FOR SUSPENSION ORAL at 10:14

## 2023-01-23 RX ADMIN — ANTACID/ANTIFLATULENT 1 EACH: 380; 550; 10; 10 GRANULE, EFFERVESCENT ORAL at 10:14

## 2023-02-07 ENCOUNTER — OFFICE VISIT (OUTPATIENT)
Dept: FAMILY MEDICINE CLINIC | Facility: CLINIC | Age: 78
End: 2023-02-07
Payer: MEDICARE

## 2023-02-07 VITALS
DIASTOLIC BLOOD PRESSURE: 94 MMHG | HEART RATE: 82 BPM | RESPIRATION RATE: 16 BRPM | HEIGHT: 61 IN | BODY MASS INDEX: 31.91 KG/M2 | SYSTOLIC BLOOD PRESSURE: 172 MMHG | TEMPERATURE: 97.3 F | WEIGHT: 169 LBS | OXYGEN SATURATION: 96 %

## 2023-02-07 DIAGNOSIS — I25.119 CORONARY ARTERY DISEASE INVOLVING NATIVE CORONARY ARTERY OF NATIVE HEART WITH ANGINA PECTORIS (HCC): ICD-10-CM

## 2023-02-07 DIAGNOSIS — E11.59 TYPE 2 DIABETES MELLITUS WITH VASCULAR DISEASE (HCC): Primary | Chronic | ICD-10-CM

## 2023-02-07 DIAGNOSIS — M54.2 NECK PAIN ON RIGHT SIDE: ICD-10-CM

## 2023-02-07 DIAGNOSIS — R49.9 HOARSENESS OR CHANGING VOICE: ICD-10-CM

## 2023-02-07 DIAGNOSIS — I25.118 CORONARY ARTERY DISEASE OF NATIVE ARTERY OF NATIVE HEART WITH STABLE ANGINA PECTORIS (HCC): Primary | ICD-10-CM

## 2023-02-07 DIAGNOSIS — R09.89 THROAT FULLNESS: ICD-10-CM

## 2023-02-07 DIAGNOSIS — Z95.1 S/P CABG X 4: Chronic | ICD-10-CM

## 2023-02-07 DIAGNOSIS — R07.2 PRECORDIAL PAIN: ICD-10-CM

## 2023-02-07 DIAGNOSIS — I10 ESSENTIAL HYPERTENSION WITH GOAL BLOOD PRESSURE LESS THAN 140/90: Chronic | ICD-10-CM

## 2023-02-07 PROCEDURE — 99214 OFFICE O/P EST MOD 30 MIN: CPT | Performed by: FAMILY MEDICINE

## 2023-02-07 PROCEDURE — G8400 PT W/DXA NO RESULTS DOC: HCPCS | Performed by: FAMILY MEDICINE

## 2023-02-07 PROCEDURE — G8484 FLU IMMUNIZE NO ADMIN: HCPCS | Performed by: FAMILY MEDICINE

## 2023-02-07 PROCEDURE — 1090F PRES/ABSN URINE INCON ASSESS: CPT | Performed by: FAMILY MEDICINE

## 2023-02-07 PROCEDURE — 1123F ACP DISCUSS/DSCN MKR DOCD: CPT | Performed by: FAMILY MEDICINE

## 2023-02-07 PROCEDURE — G8417 CALC BMI ABV UP PARAM F/U: HCPCS | Performed by: FAMILY MEDICINE

## 2023-02-07 PROCEDURE — 3078F DIAST BP <80 MM HG: CPT | Performed by: FAMILY MEDICINE

## 2023-02-07 PROCEDURE — 3074F SYST BP LT 130 MM HG: CPT | Performed by: FAMILY MEDICINE

## 2023-02-07 PROCEDURE — G8427 DOCREV CUR MEDS BY ELIG CLIN: HCPCS | Performed by: FAMILY MEDICINE

## 2023-02-07 PROCEDURE — 1036F TOBACCO NON-USER: CPT | Performed by: FAMILY MEDICINE

## 2023-02-07 RX ORDER — AMLODIPINE BESYLATE 5 MG/1
5 TABLET ORAL DAILY
Qty: 90 TABLET | Refills: 3 | Status: SHIPPED | OUTPATIENT
Start: 2023-02-07

## 2023-02-07 RX ORDER — FAMOTIDINE 40 MG/1
TABLET, FILM COATED ORAL
COMMUNITY
Start: 2023-01-04

## 2023-02-07 SDOH — ECONOMIC STABILITY: FOOD INSECURITY: WITHIN THE PAST 12 MONTHS, THE FOOD YOU BOUGHT JUST DIDN'T LAST AND YOU DIDN'T HAVE MONEY TO GET MORE.: NEVER TRUE

## 2023-02-07 SDOH — ECONOMIC STABILITY: INCOME INSECURITY: HOW HARD IS IT FOR YOU TO PAY FOR THE VERY BASICS LIKE FOOD, HOUSING, MEDICAL CARE, AND HEATING?: NOT HARD AT ALL

## 2023-02-07 SDOH — ECONOMIC STABILITY: FOOD INSECURITY: WITHIN THE PAST 12 MONTHS, YOU WORRIED THAT YOUR FOOD WOULD RUN OUT BEFORE YOU GOT MONEY TO BUY MORE.: NEVER TRUE

## 2023-02-07 SDOH — ECONOMIC STABILITY: HOUSING INSECURITY
IN THE LAST 12 MONTHS, WAS THERE A TIME WHEN YOU DID NOT HAVE A STEADY PLACE TO SLEEP OR SLEPT IN A SHELTER (INCLUDING NOW)?: NO

## 2023-02-07 ASSESSMENT — PATIENT HEALTH QUESTIONNAIRE - PHQ9
SUM OF ALL RESPONSES TO PHQ QUESTIONS 1-9: 0
1. LITTLE INTEREST OR PLEASURE IN DOING THINGS: 0
SUM OF ALL RESPONSES TO PHQ QUESTIONS 1-9: 0
SUM OF ALL RESPONSES TO PHQ QUESTIONS 1-9: 0
SUM OF ALL RESPONSES TO PHQ9 QUESTIONS 1 & 2: 0
2. FEELING DOWN, DEPRESSED OR HOPELESS: 0
SUM OF ALL RESPONSES TO PHQ QUESTIONS 1-9: 0

## 2023-02-07 NOTE — PROGRESS NOTES
Abdiaziz Blakely (:  1945) is a 65 y.o. female,Established patient, here for evaluation of the following chief complaint(s):  Diabetes         ASSESSMENT/PLAN:  1. 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 Coronary artery disease involving native coronary artery of native heart with angina pectoris ([facility]  3. Precordial pain  4. Essential hypertension with goal blood pressure less than 140/90  -     amLODIPine (NORVASC) 5 MG tablet; Take 1 tablet by mouth daily, Disp-90 tablet, R-3Normal  5. S/P CABG x 4  6. Throat fullness  -     CT SOFT TISSUE NECK W CONTRAST; Future  7. Hoarseness or changing voice  -     CT SOFT TISSUE NECK W CONTRAST; Future  8. Neck pain on right side  -     CT SOFT TISSUE NECK W CONTRAST; Future    Plan:  No change in medication  CT of neck to r/o nodule or mass. Consider shingles vaccine. Get home test for COVID and take if have symptoms. Call office if positive. Return in about 6 months (around 2023) for EOV, wait for new Dr. or give Drs list.         Subjective   SUBJECTIVE/OBJECTIVE:  HPI  66 yo female with underlying NIDDM, HTN, CAD, s/p CABG, GERD, h/o breast cancer,   Here for check up. Also concern for fullness in right side of neck. She has felt a nodule on right side of neck. No change inswallowing. She had pneumonia in . Feels like she has not recovered since then. Getting short of breath with any exertion. H/o breast cancer in . Then 10 normal mammogram.  Told she doesn't need repeats. Recurrent acid reflux and burning. Improved somewhat with famotidine. HTN:  Compliant with amlodipine, imdur. Reports it is good at home. She checks regulaly. Always elevated at Drs office. Has not been checking blood sugar or taking any medication for diabetes. Last A1c was 2 months ago: 7.2     She has elevated cholesterol and triglycerides.   Thought to be famial.   She sees cardiology and is on Repatha injections. Review of Systems   Constitutional:  Negative for activity change, appetite change and diaphoresis. HENT:  Positive for voice change. Negative for trouble swallowing. Respiratory: Negative. Cardiovascular: Negative. Gastrointestinal: Negative. Musculoskeletal:  Positive for myalgias. Neurological:  Negative for speech difficulty. Objective   Physical Exam  Vitals and nursing note reviewed. Constitutional:       General: She is not in acute distress. Appearance: She is obese. She is not ill-appearing or toxic-appearing. HENT:      Head: Mass present. Cardiovascular:      Rate and Rhythm: Normal rate and regular rhythm. Pulmonary:      Effort: Pulmonary effort is normal.      Breath sounds: Normal breath sounds. Skin:     Capillary Refill: Capillary refill takes less than 2 seconds. Neurological:      Mental Status: She is alert. Vitals:    02/07/23 1426   BP: (!) 172/94   Pulse:    Resp:    Temp:    SpO2:      Hemoglobin A1C   Date Value Ref Range Status   11/01/2022 7.2 (H) 4.8 - 5.6 % Final       On this date 2/7/2023 I have spent 30 minutes reviewing previous notes, test results and face to face with the patient discussing the diagnosis and importance of compliance with the treatment plan as well as documenting on the day of the visit. An electronic signature was used to authenticate this note.     --Erica Baker MD, MD

## 2023-02-12 ASSESSMENT — ENCOUNTER SYMPTOMS
VOICE CHANGE: 1
RESPIRATORY NEGATIVE: 1
GASTROINTESTINAL NEGATIVE: 1
TROUBLE SWALLOWING: 0

## 2023-03-30 ENCOUNTER — OFFICE VISIT (OUTPATIENT)
Dept: CARDIOLOGY CLINIC | Age: 78
End: 2023-03-30
Payer: MEDICARE

## 2023-03-30 VITALS
SYSTOLIC BLOOD PRESSURE: 134 MMHG | HEART RATE: 78 BPM | BODY MASS INDEX: 31.38 KG/M2 | WEIGHT: 166.2 LBS | HEIGHT: 61 IN | DIASTOLIC BLOOD PRESSURE: 84 MMHG

## 2023-03-30 DIAGNOSIS — I65.23 BILATERAL CAROTID ARTERY STENOSIS: ICD-10-CM

## 2023-03-30 DIAGNOSIS — I25.118 CORONARY ARTERY DISEASE OF NATIVE ARTERY OF NATIVE HEART WITH STABLE ANGINA PECTORIS (HCC): Primary | Chronic | ICD-10-CM

## 2023-03-30 DIAGNOSIS — I50.22 CHRONIC SYSTOLIC (CONGESTIVE) HEART FAILURE (HCC): Chronic | ICD-10-CM

## 2023-03-30 DIAGNOSIS — I50.22 CHRONIC SYSTOLIC (CONGESTIVE) HEART FAILURE (HCC): ICD-10-CM

## 2023-03-30 DIAGNOSIS — I10 ESSENTIAL HYPERTENSION WITH GOAL BLOOD PRESSURE LESS THAN 140/90: Chronic | ICD-10-CM

## 2023-03-30 PROCEDURE — G8417 CALC BMI ABV UP PARAM F/U: HCPCS | Performed by: INTERNAL MEDICINE

## 2023-03-30 PROCEDURE — G8400 PT W/DXA NO RESULTS DOC: HCPCS | Performed by: INTERNAL MEDICINE

## 2023-03-30 PROCEDURE — G8484 FLU IMMUNIZE NO ADMIN: HCPCS | Performed by: INTERNAL MEDICINE

## 2023-03-30 PROCEDURE — 3075F SYST BP GE 130 - 139MM HG: CPT | Performed by: INTERNAL MEDICINE

## 2023-03-30 PROCEDURE — 99214 OFFICE O/P EST MOD 30 MIN: CPT | Performed by: INTERNAL MEDICINE

## 2023-03-30 PROCEDURE — 1123F ACP DISCUSS/DSCN MKR DOCD: CPT | Performed by: INTERNAL MEDICINE

## 2023-03-30 PROCEDURE — 1090F PRES/ABSN URINE INCON ASSESS: CPT | Performed by: INTERNAL MEDICINE

## 2023-03-30 PROCEDURE — 1036F TOBACCO NON-USER: CPT | Performed by: INTERNAL MEDICINE

## 2023-03-30 PROCEDURE — G8427 DOCREV CUR MEDS BY ELIG CLIN: HCPCS | Performed by: INTERNAL MEDICINE

## 2023-03-30 PROCEDURE — 3079F DIAST BP 80-89 MM HG: CPT | Performed by: INTERNAL MEDICINE

## 2023-03-30 RX ORDER — METOPROLOL SUCCINATE 25 MG/1
25 TABLET, EXTENDED RELEASE ORAL DAILY
Qty: 90 TABLET | Refills: 3 | Status: SHIPPED | OUTPATIENT
Start: 2023-03-30

## 2023-03-30 RX ORDER — OMEPRAZOLE 40 MG/1
CAPSULE, DELAYED RELEASE ORAL
COMMUNITY
Start: 2023-01-04

## 2023-03-30 NOTE — PROGRESS NOTES
(H) 08/29/2019     Lab Results   Component Value Date    TRIG 591 (H) 11/01/2022    TRIG 482 (H) 08/29/2019     Lab Results   Component Value Date    HDL 37 (L) 11/01/2022    HDL 38 (L) 08/29/2019     Lab Results   Component Value Date    [facility] Not calculated due to elevated triglyceride level 11/01/2022    [facility] Comment 08/29/2019     Lab Results   Component Value Date    LABVLDL 118.2 (H) 11/01/2022    LABVLDL Comment 08/29/2019     Lab Results   Component Value Date    CHOLHDLRATIO 6.1 11/01/2022           I have Independently reviewed prior care notes, any ER records available, cardiac testing, labs and results with the patient and before seeing the patient today. Also independently reviewed outside records when available. ASSESSMENT:    Leesa Nixon was seen today for coronary artery disease. Diagnoses and all orders for this visit:    Coronary artery disease of native artery of native heart with stable angina pectoris (HCC)    Chronic systolic (congestive) heart failure (HCC)    Bilateral carotid artery stenosis    Chronic systolic (congestive) heart failure    Essential hypertension with goal blood pressure less than 140/90    Other orders  -     metoprolol succinate (TOPROL XL) 25 MG extended release tablet; Take 1 tablet by mouth daily        PLAN:     Encouraged her to seek Eyevensys. 1. HTN:  BP better at home, she has extreme white coat HTN. Need to follow as stressed. Better now. 2. CAD: Remain on ASA. On imdur as well. On norvasc. Trial of toprol. Work on diabetes mgmt, lipids mgmt, back on repatha. Severe CAD, declining CABG. Declined cardiac rehab. SCD risk known. High risk for GI procedure. 3. HPL:  back on repatha now. 4. Carotid Dz:  no more imaging needed now. Patient has been instructed and agrees to call our office with any issues or other concerns related to their cardiac condition(s) and/or complaint(s).         Return

## 2023-06-19 ENCOUNTER — OFFICE VISIT (OUTPATIENT)
Age: 78
End: 2023-06-19
Payer: MEDICARE

## 2023-06-19 VITALS
BODY MASS INDEX: 31.15 KG/M2 | SYSTOLIC BLOOD PRESSURE: 151 MMHG | HEART RATE: 54 BPM | HEIGHT: 61 IN | WEIGHT: 165 LBS | DIASTOLIC BLOOD PRESSURE: 69 MMHG

## 2023-06-19 DIAGNOSIS — I25.118 CORONARY ARTERY DISEASE OF NATIVE ARTERY OF NATIVE HEART WITH STABLE ANGINA PECTORIS (HCC): Primary | Chronic | ICD-10-CM

## 2023-06-19 DIAGNOSIS — I50.22 CHRONIC SYSTOLIC (CONGESTIVE) HEART FAILURE (HCC): Chronic | ICD-10-CM

## 2023-06-19 DIAGNOSIS — I10 ESSENTIAL HYPERTENSION WITH GOAL BLOOD PRESSURE LESS THAN 140/90: Chronic | ICD-10-CM

## 2023-06-19 DIAGNOSIS — I65.23 BILATERAL CAROTID ARTERY STENOSIS: Chronic | ICD-10-CM

## 2023-06-19 PROCEDURE — 1090F PRES/ABSN URINE INCON ASSESS: CPT | Performed by: INTERNAL MEDICINE

## 2023-06-19 PROCEDURE — 3077F SYST BP >= 140 MM HG: CPT | Performed by: INTERNAL MEDICINE

## 2023-06-19 PROCEDURE — G8400 PT W/DXA NO RESULTS DOC: HCPCS | Performed by: INTERNAL MEDICINE

## 2023-06-19 PROCEDURE — 99214 OFFICE O/P EST MOD 30 MIN: CPT | Performed by: INTERNAL MEDICINE

## 2023-06-19 PROCEDURE — 1123F ACP DISCUSS/DSCN MKR DOCD: CPT | Performed by: INTERNAL MEDICINE

## 2023-06-19 PROCEDURE — G8417 CALC BMI ABV UP PARAM F/U: HCPCS | Performed by: INTERNAL MEDICINE

## 2023-06-19 PROCEDURE — G8428 CUR MEDS NOT DOCUMENT: HCPCS | Performed by: INTERNAL MEDICINE

## 2023-06-19 PROCEDURE — 1036F TOBACCO NON-USER: CPT | Performed by: INTERNAL MEDICINE

## 2023-06-19 PROCEDURE — 3078F DIAST BP <80 MM HG: CPT | Performed by: INTERNAL MEDICINE

## 2023-06-19 RX ORDER — ISOSORBIDE MONONITRATE 30 MG/1
30 TABLET, EXTENDED RELEASE ORAL DAILY
Qty: 90 TABLET | Refills: 3 | Status: SHIPPED | OUTPATIENT
Start: 2023-06-19

## 2023-06-19 RX ORDER — AMLODIPINE BESYLATE 5 MG/1
5 TABLET ORAL DAILY
Qty: 90 TABLET | Refills: 3 | Status: SHIPPED | OUTPATIENT
Start: 2023-06-19

## 2023-06-19 RX ORDER — METOPROLOL SUCCINATE 25 MG/1
25 TABLET, EXTENDED RELEASE ORAL DAILY
Qty: 90 TABLET | Refills: 3 | Status: SHIPPED | OUTPATIENT
Start: 2023-06-19

## 2023-06-19 NOTE — PROGRESS NOTES
[address], [address] [employer] [address]  PHONE: [phone]     23    NAME:  Victoria Norman  : 1945  MRN: [id number]       SUBJECTIVE:   Victoria Norman is a 65 y.o. female seen for a follow up visit regarding the following:     Chief Complaint   Patient presents with    Coronary Artery Disease       HPI:  Here for CAD, 4v CABG on 16 by Dr. Adia Storey. Less than 50% ICAs . Echo 2022: low normal EF. LHC 2022: severe CAD, redo CABG recommended     Walking, cutting grass, some lack of energy. No CP, angina now. Some angina, some GIBBONS in yard. On repatha now, doing well now. Patient denies recent history of orthopnea, PND, excessive dizziness and/or syncope. Ace cough after CABG. From [facility], here since . Past Medical History, Past Surgical History, Family history, Social History, and Medications were all reviewed with the patient today and updated as necessary.      Current Outpatient Medications   Medication Sig Dispense Refill    metoprolol succinate (TOPROL XL) 25 MG extended release tablet Take 1 tablet by mouth daily 90 tablet 3    amLODIPine (NORVASC) 5 MG tablet Take 1 tablet by mouth daily 90 tablet 3    isosorbide mononitrate (IMDUR) 30 MG extended release tablet Take 1 tablet by mouth daily 90 tablet 3    omeprazole (PRILOSEC) 40 MG delayed release capsule TAKE 1 CAPSULE BY MOUTH EVERY DAY 30 MIN BEFORE BREAKFAST      famotidine (PEPCID) 40 MG tablet TAKE 1 TABLET BY MOUTH EVERYDAY AT BEDTIME      Evolocumab 140 MG/ML SOAJ Inject 140 mg into the skin every 14 days 6 Adjustable Dose Pre-filled Pen Syringe 3    Alum & Mag Hydroxide-Simeth (ANTACID ANTI-GAS PO) Take by mouth      magnesium 30 MG tablet Take 1 tablet by mouth 2 times daily      Ascorbic Acid (VITAMIN C) 250 MG tablet Take 1 tablet by mouth daily      ibuprofen (ADVIL;MOTRIN) 200 MG tablet Take 1 tablet by mouth every 6 hours as needed for Pain

## 2023-08-21 ENCOUNTER — TELEPHONE (OUTPATIENT)
Age: 78
End: 2023-08-21

## 2023-08-21 DIAGNOSIS — E78.00 PURE HYPERCHOLESTEROLEMIA: Primary | ICD-10-CM

## 2023-08-21 NOTE — TELEPHONE ENCOUNTER
Mrs. Margo Miles came in today and reported she had a reaction her last two injections. The one in July was in her left leg and left a hard knot the size of a golf ball. It is resolved now. August 17th she had a quarter size spot that was discolored on her right thigh. She wanted to make you aware and ask if you want her to stop taking Repatha I reported the information to [facility].  Kathleen Ventura

## 2023-08-21 NOTE — TELEPHONE ENCOUNTER
I spoke with Mrs. Baldwin. She will D/C Repatha for now and have her lipid panel drawn before her visit in November withDr. Alberto Cadet.   Lizzeth aSba

## 2023-10-24 ENCOUNTER — TELEPHONE (OUTPATIENT)
Age: 78
End: 2023-10-24

## 2023-10-24 NOTE — TELEPHONE ENCOUNTER
----- Message from Dagoberto Velarde DO sent at 10/23/2023  3:42 PM EDT -----  Please call her trig very high, lipids not good. Is she taking repatha? If not, needs back on it. And trig high, very high. We need med for just this as well. Needs better diet as well, this is key. Add fenofibrate 160 daily. Avoid sweets and junk, needs great diet. Will review when I see her back.    Thanks

## 2023-11-10 ENCOUNTER — OFFICE VISIT (OUTPATIENT)
Age: 78
End: 2023-11-10
Payer: MEDICARE

## 2023-11-10 VITALS
HEART RATE: 56 BPM | BODY MASS INDEX: 31.72 KG/M2 | WEIGHT: 168 LBS | SYSTOLIC BLOOD PRESSURE: 148 MMHG | DIASTOLIC BLOOD PRESSURE: 82 MMHG | HEIGHT: 61 IN

## 2023-11-10 DIAGNOSIS — Z95.1 S/P CABG X 4: Chronic | ICD-10-CM

## 2023-11-10 DIAGNOSIS — I25.118 CORONARY ARTERY DISEASE OF NATIVE ARTERY OF NATIVE HEART WITH STABLE ANGINA PECTORIS (HCC): Chronic | ICD-10-CM

## 2023-11-10 DIAGNOSIS — E78.00 PURE HYPERCHOLESTEROLEMIA: Chronic | ICD-10-CM

## 2023-11-10 DIAGNOSIS — I65.23 BILATERAL CAROTID ARTERY STENOSIS: Chronic | ICD-10-CM

## 2023-11-10 DIAGNOSIS — I50.22 CHRONIC SYSTOLIC (CONGESTIVE) HEART FAILURE (HCC): Primary | Chronic | ICD-10-CM

## 2023-11-10 PROCEDURE — 1123F ACP DISCUSS/DSCN MKR DOCD: CPT | Performed by: INTERNAL MEDICINE

## 2023-11-10 PROCEDURE — G8484 FLU IMMUNIZE NO ADMIN: HCPCS | Performed by: INTERNAL MEDICINE

## 2023-11-10 PROCEDURE — 1090F PRES/ABSN URINE INCON ASSESS: CPT | Performed by: INTERNAL MEDICINE

## 2023-11-10 PROCEDURE — 1036F TOBACCO NON-USER: CPT | Performed by: INTERNAL MEDICINE

## 2023-11-10 PROCEDURE — 3077F SYST BP >= 140 MM HG: CPT | Performed by: INTERNAL MEDICINE

## 2023-11-10 PROCEDURE — G8400 PT W/DXA NO RESULTS DOC: HCPCS | Performed by: INTERNAL MEDICINE

## 2023-11-10 PROCEDURE — 3078F DIAST BP <80 MM HG: CPT | Performed by: INTERNAL MEDICINE

## 2023-11-10 PROCEDURE — G8417 CALC BMI ABV UP PARAM F/U: HCPCS | Performed by: INTERNAL MEDICINE

## 2023-11-10 PROCEDURE — 99214 OFFICE O/P EST MOD 30 MIN: CPT | Performed by: INTERNAL MEDICINE

## 2023-11-10 PROCEDURE — G8427 DOCREV CUR MEDS BY ELIG CLIN: HCPCS | Performed by: INTERNAL MEDICINE

## 2023-11-10 RX ORDER — FAMOTIDINE 40 MG/1
40 TABLET, FILM COATED ORAL
Qty: 90 TABLET | Refills: 3 | Status: SHIPPED | OUTPATIENT
Start: 2023-11-10 | End: 2024-11-04

## 2023-11-10 RX ORDER — OMEPRAZOLE 40 MG/1
CAPSULE, DELAYED RELEASE ORAL
Qty: 90 CAPSULE | Refills: 3 | Status: SHIPPED | OUTPATIENT
Start: 2023-11-10

## 2023-11-10 RX ORDER — ISOSORBIDE MONONITRATE 30 MG/1
30 TABLET, EXTENDED RELEASE ORAL DAILY
Qty: 90 TABLET | Refills: 3 | Status: SHIPPED | OUTPATIENT
Start: 2023-11-10

## 2023-11-10 RX ORDER — FENOFIBRATE 160 MG/1
160 TABLET ORAL DAILY
Qty: 90 TABLET | Refills: 1 | Status: SHIPPED | OUTPATIENT
Start: 2023-11-10

## 2023-11-10 NOTE — PROGRESS NOTES
[address] [facility], [facility], [place]  PHONE: [phone]     11/10/23    NAME:  Amber Galindo  : 1945  MRN: [id number]       SUBJECTIVE:   Amber Galindo is a 65 y.o. female seen for a follow up visit regarding the following:     Chief Complaint   Patient presents with    Coronary Artery Disease       HPI: Here for CAD, 4v CABG on 16 by Dr. Prosper Beverly. Less than 50% ICAs . Echo 2022: low normal EF. LHC 2022: severe CAD, redo CABG recommended     Walking, cutting grass, some lack of energy. No CP, angina now. GIBBONS ok now. NO CP/pressure. Off lipids meds. No new GIBBONS, SOB. Patient denies recent history of orthopnea, PND, excessive dizziness and/or syncope. Ace cough after CABG. From [facility], here since . Past Medical History, Past Surgical History, Family history, Social History, and Medications were all reviewed with the patient today and updated as necessary.      Current Outpatient Medications   Medication Sig Dispense Refill    Evolocumab 140 MG/ML SOAJ Inject 140 mg into the skin every 14 days 6 Adjustable Dose Pre-filled Pen Syringe 3    fenofibrate (TRIGLIDE) 160 MG tablet Take 1 tablet by mouth daily 90 tablet 1    metoprolol succinate (TOPROL XL) 25 MG extended release tablet Take 1 tablet by mouth daily 90 tablet 3    amLODIPine (NORVASC) 5 MG tablet Take 1 tablet by mouth daily 90 tablet 3    isosorbide mononitrate (IMDUR) 30 MG extended release tablet Take 1 tablet by mouth daily 90 tablet 3    omeprazole (PRILOSEC) 40 MG delayed release capsule TAKE 1 CAPSULE BY MOUTH EVERY DAY 30 MIN BEFORE BREAKFAST      famotidine (PEPCID) 40 MG tablet TAKE 1 TABLET BY MOUTH EVERYDAY AT BEDTIME      Alum & Mag Hydroxide-Simeth (ANTACID ANTI-GAS PO) Take by mouth      magnesium 30 MG tablet Take 1 tablet by mouth 2 times daily      Ascorbic Acid (VITAMIN C) 250 MG tablet Take 1 tablet by mouth daily      ibuprofen

## 2023-12-17 ENCOUNTER — CLINICAL DOCUMENTATION (OUTPATIENT)
Age: 78
End: 2023-12-17

## 2024-03-27 DIAGNOSIS — I50.22 CHRONIC SYSTOLIC (CONGESTIVE) HEART FAILURE (HCC): Chronic | ICD-10-CM

## 2024-03-27 DIAGNOSIS — I25.118 CORONARY ARTERY DISEASE OF NATIVE ARTERY OF NATIVE HEART WITH STABLE ANGINA PECTORIS (HCC): Chronic | ICD-10-CM

## 2024-03-27 LAB
ALBUMIN SERPL-MCNC: 4.2 G/DL (ref 3.2–4.6)
ALBUMIN/GLOB SERPL: 1.1 (ref 0.4–1.6)
ALP SERPL-CCNC: 69 U/L (ref 50–136)
ALT SERPL-CCNC: 30 U/L (ref 12–65)
ANION GAP SERPL CALC-SCNC: 5 MMOL/L (ref 2–11)
AST SERPL-CCNC: 19 U/L (ref 15–37)
BILIRUB SERPL-MCNC: 0.3 MG/DL (ref 0.2–1.1)
BUN SERPL-MCNC: 20 MG/DL (ref 8–23)
CALCIUM SERPL-MCNC: 10.7 MG/DL (ref 8.3–10.4)
CHLORIDE SERPL-SCNC: 104 MMOL/L (ref 103–113)
CHOLEST SERPL-MCNC: 110 MG/DL
CO2 SERPL-SCNC: 30 MMOL/L (ref 21–32)
CREAT SERPL-MCNC: 1 MG/DL (ref 0.6–1)
GLOBULIN SER CALC-MCNC: 3.8 G/DL (ref 2.8–4.5)
GLUCOSE SERPL-MCNC: 169 MG/DL (ref 65–100)
HDLC SERPL-MCNC: 52 MG/DL (ref 40–60)
HDLC SERPL: 2.1
LDLC SERPL CALC-MCNC: 27.6 MG/DL
POTASSIUM SERPL-SCNC: 4.2 MMOL/L (ref 3.5–5.1)
PROT SERPL-MCNC: 8 G/DL (ref 6.3–8.2)
SODIUM SERPL-SCNC: 139 MMOL/L (ref 136–146)
TRIGL SERPL-MCNC: 152 MG/DL (ref 35–150)
VLDLC SERPL CALC-MCNC: 30.4 MG/DL (ref 6–23)

## 2024-03-28 ENCOUNTER — TELEPHONE (OUTPATIENT)
Age: 79
End: 2024-03-28

## 2024-03-28 NOTE — TELEPHONE ENCOUNTER
----- Message from Benjamin Chirinos DO sent at 3/28/2024  8:40 AM EDT -----  Please call her, trig and lipids are great.  Great news on labs.  Call for issues. See me as planned, stay on meds.   Thanks

## 2024-04-22 ENCOUNTER — OFFICE VISIT (OUTPATIENT)
Age: 79
End: 2024-04-22
Payer: MEDICARE

## 2024-04-22 VITALS
HEIGHT: 61 IN | HEART RATE: 71 BPM | DIASTOLIC BLOOD PRESSURE: 90 MMHG | SYSTOLIC BLOOD PRESSURE: 190 MMHG | BODY MASS INDEX: 31.72 KG/M2 | WEIGHT: 168 LBS

## 2024-04-22 DIAGNOSIS — I25.118 CORONARY ARTERY DISEASE OF NATIVE ARTERY OF NATIVE HEART WITH STABLE ANGINA PECTORIS (HCC): Primary | ICD-10-CM

## 2024-04-22 DIAGNOSIS — I65.23 BILATERAL CAROTID ARTERY STENOSIS: Chronic | ICD-10-CM

## 2024-04-22 DIAGNOSIS — I50.22 CHRONIC SYSTOLIC (CONGESTIVE) HEART FAILURE (HCC): ICD-10-CM

## 2024-04-22 DIAGNOSIS — E78.00 PURE HYPERCHOLESTEROLEMIA: Chronic | ICD-10-CM

## 2024-04-22 DIAGNOSIS — Z95.1 S/P CABG X 4: Chronic | ICD-10-CM

## 2024-04-22 DIAGNOSIS — I65.23 BILATERAL CAROTID ARTERY STENOSIS: ICD-10-CM

## 2024-04-22 PROCEDURE — 1123F ACP DISCUSS/DSCN MKR DOCD: CPT | Performed by: INTERNAL MEDICINE

## 2024-04-22 PROCEDURE — 99214 OFFICE O/P EST MOD 30 MIN: CPT | Performed by: INTERNAL MEDICINE

## 2024-04-22 PROCEDURE — 1036F TOBACCO NON-USER: CPT | Performed by: INTERNAL MEDICINE

## 2024-04-22 PROCEDURE — G8417 CALC BMI ABV UP PARAM F/U: HCPCS | Performed by: INTERNAL MEDICINE

## 2024-04-22 PROCEDURE — 3079F DIAST BP 80-89 MM HG: CPT | Performed by: INTERNAL MEDICINE

## 2024-04-22 PROCEDURE — G8428 CUR MEDS NOT DOCUMENT: HCPCS | Performed by: INTERNAL MEDICINE

## 2024-04-22 PROCEDURE — G8400 PT W/DXA NO RESULTS DOC: HCPCS | Performed by: INTERNAL MEDICINE

## 2024-04-22 PROCEDURE — 1090F PRES/ABSN URINE INCON ASSESS: CPT | Performed by: INTERNAL MEDICINE

## 2024-04-22 PROCEDURE — 3077F SYST BP >= 140 MM HG: CPT | Performed by: INTERNAL MEDICINE

## 2024-04-22 PROCEDURE — 93000 ELECTROCARDIOGRAM COMPLETE: CPT | Performed by: INTERNAL MEDICINE

## 2024-04-22 RX ORDER — VITAMIN E 268 MG
400 CAPSULE ORAL DAILY
COMMUNITY

## 2024-04-22 RX ORDER — CYANOCOBALAMIN (VITAMIN B-12) 500 MCG
TABLET ORAL
COMMUNITY

## 2024-04-22 RX ORDER — VITAMIN B COMPLEX
1 CAPSULE ORAL DAILY
COMMUNITY

## 2024-04-22 NOTE — PROGRESS NOTES
Component Value Date     11/01/2022       Lab Results   Component Value Date    CHOL 110 03/27/2024    CHOL 323 (H) 10/23/2023    CHOL 226 (H) 11/01/2022     Lab Results   Component Value Date    TRIG 152 (H) 03/27/2024    TRIG 706 (H) 10/23/2023    TRIG 591 (H) 11/01/2022     Lab Results   Component Value Date    HDL 52 03/27/2024    HDL 39 (L) 10/23/2023    HDL 37 (L) 11/01/2022     Lab Results   Component Value Date    LDLCALC 27.6 03/27/2024    LDLCALC Not calculated due to elevated triglyceride level 10/23/2023    LDLCALC Not calculated due to elevated triglyceride level 11/01/2022     No results found for: \"VLDL\"  Lab Results   Component Value Date    CHOLHDLRATIO 2.1 03/27/2024    CHOLHDLRATIO 8.3 10/23/2023    CHOLHDLRATIO 6.1 11/01/2022             I have Independently reviewed prior care notes, any ER records available, cardiac testing, labs and results with the patient and before seeing the patient today.  Also independently reviewed outside records when available.       ASSESSMENT:    Cathryn was seen today for coronary artery disease and hypertension.    Diagnoses and all orders for this visit:    Coronary artery disease of native artery of native heart with stable angina pectoris (HCC)  -     EKG 12 lead    Bilateral carotid artery stenosis    Chronic systolic (congestive) heart failure (HCC)    Bilateral carotid artery stenosis    Pure hypercholesterolemia    S/P CABG x 4          PLAN:     1. HTN:  BP better at home, she has extreme white coat HTN.    Need to follow as stressed.   Better now.        2. CAD: Remain on ASA.   On imdur as well. On norvasc.    Back on toprol.    Work on diabetes mgmt.   Walking and exercise are key for her.       Severe CAD, declining CABG.  Declined cardiac rehab.  SCD risk known.            3. HPL: labs MUCH better on repatha and fibrate. Trig 706 to 152 on meds, MUCH better!!!     4. Carotid Dz:  no more imaging needed now.     New PCP in July.      Patient

## 2024-04-29 RX ORDER — FENOFIBRATE 160 MG/1
160 TABLET ORAL DAILY
Qty: 90 TABLET | Refills: 1 | Status: SHIPPED | OUTPATIENT
Start: 2024-04-29

## 2024-06-11 RX ORDER — METOPROLOL SUCCINATE 25 MG/1
25 TABLET, EXTENDED RELEASE ORAL DAILY
Qty: 90 TABLET | Refills: 3 | Status: SHIPPED | OUTPATIENT
Start: 2024-06-11

## 2024-06-20 ENCOUNTER — TELEPHONE (OUTPATIENT)
Age: 79
End: 2024-06-20

## 2024-06-20 NOTE — TELEPHONE ENCOUNTER
Patient is asking if she still needs to take the evolocumab injections since her numbers were better on her April labs.

## 2024-08-23 ENCOUNTER — OFFICE VISIT (OUTPATIENT)
Age: 79
End: 2024-08-23
Payer: MEDICARE

## 2024-08-23 VITALS
SYSTOLIC BLOOD PRESSURE: 212 MMHG | DIASTOLIC BLOOD PRESSURE: 82 MMHG | BODY MASS INDEX: 32.1 KG/M2 | HEIGHT: 61 IN | WEIGHT: 170 LBS | HEART RATE: 76 BPM

## 2024-08-23 DIAGNOSIS — I50.22 CHRONIC SYSTOLIC (CONGESTIVE) HEART FAILURE (HCC): Chronic | ICD-10-CM

## 2024-08-23 DIAGNOSIS — I25.118 CORONARY ARTERY DISEASE OF NATIVE ARTERY OF NATIVE HEART WITH STABLE ANGINA PECTORIS (HCC): Primary | Chronic | ICD-10-CM

## 2024-08-23 DIAGNOSIS — I10 ESSENTIAL HYPERTENSION WITH GOAL BLOOD PRESSURE LESS THAN 140/90: Chronic | ICD-10-CM

## 2024-08-23 DIAGNOSIS — I65.23 BILATERAL CAROTID ARTERY STENOSIS: Chronic | ICD-10-CM

## 2024-08-23 PROCEDURE — 1036F TOBACCO NON-USER: CPT | Performed by: INTERNAL MEDICINE

## 2024-08-23 PROCEDURE — 1123F ACP DISCUSS/DSCN MKR DOCD: CPT | Performed by: INTERNAL MEDICINE

## 2024-08-23 PROCEDURE — 1090F PRES/ABSN URINE INCON ASSESS: CPT | Performed by: INTERNAL MEDICINE

## 2024-08-23 PROCEDURE — G8400 PT W/DXA NO RESULTS DOC: HCPCS | Performed by: INTERNAL MEDICINE

## 2024-08-23 PROCEDURE — 99214 OFFICE O/P EST MOD 30 MIN: CPT | Performed by: INTERNAL MEDICINE

## 2024-08-23 PROCEDURE — G8417 CALC BMI ABV UP PARAM F/U: HCPCS | Performed by: INTERNAL MEDICINE

## 2024-08-23 PROCEDURE — G8428 CUR MEDS NOT DOCUMENT: HCPCS | Performed by: INTERNAL MEDICINE

## 2024-08-23 PROCEDURE — 3077F SYST BP >= 140 MM HG: CPT | Performed by: INTERNAL MEDICINE

## 2024-08-23 PROCEDURE — 3079F DIAST BP 80-89 MM HG: CPT | Performed by: INTERNAL MEDICINE

## 2024-08-23 RX ORDER — METOPROLOL SUCCINATE 50 MG/1
50 TABLET, EXTENDED RELEASE ORAL DAILY
Qty: 90 TABLET | Refills: 3 | Status: SHIPPED | OUTPATIENT
Start: 2024-08-23

## 2024-08-23 NOTE — PROGRESS NOTES
[address] [facility], [place] [address]  PHONE: [phone]     24    NAME:  Cathryn Baldwin  : 1945  MRN: [id number]       SUBJECTIVE:   Cathryn Baldwin is a 78 y.o. female seen for a follow up visit regarding the following:     Chief Complaint   Patient presents with    Coronary Artery Disease       HPI: Here for CAD, 4v CABG on 16 by Dr. Jackson.     Less than 50% ICAs .     Echo 2022: low normal EF.   LHC 2022: severe CAD, redo CABG recommended     Walking, busy in yard all day.  Some leg cramps now.   No CP, angina now.  GIBBONS ok now.  NO CP/pressure.    No new GIBBONS, SOB.    Patient denies recent history of orthopnea, PND, excessive dizziness and/or syncope.         Ace cough after CABG.       From [place], here since .           Past Medical History, Past Surgical History, Family history, Social History, and Medications were all reviewed with the patient today and updated as necessary.     Current Outpatient Medications   Medication Sig Dispense Refill    metoprolol succinate (TOPROL XL) 50 MG extended release tablet Take 1 tablet by mouth daily 90 tablet 3    fenofibrate (TRIGLIDE) 160 MG tablet TAKE 1 TABLET BY MOUTH EVERY DAY 90 tablet 1    Cyanocobalamin (VITAMIN B 12) 500 MCG TABS Take by mouth      vitamin E 400 UNIT capsule Take 1 capsule by mouth daily      b complex vitamins capsule Take 1 capsule by mouth daily      Evolocumab 140 MG/ML SOAJ Inject 140 mg into the skin every 14 days 6 Adjustable Dose Pre-filled Pen Syringe 3    isosorbide mononitrate (IMDUR) 30 MG extended release tablet Take 1 tablet by mouth daily 90 tablet 3    omeprazole (PRILOSEC) 40 MG delayed release capsule TAKE 1 CAPSULE BY MOUTH EVERY DAY 30 MIN BEFORE BREAKFAST 90 capsule 3    famotidine (PEPCID) 40 MG tablet Take 1 tablet by mouth nightly 90 tablet 3    amLODIPine (NORVASC) 5 MG tablet Take 1 tablet by mouth daily 90 tablet 3    Alum & Mag Hydroxide-Simeth

## 2024-08-27 ENCOUNTER — OFFICE VISIT (OUTPATIENT)
Dept: FAMILY MEDICINE CLINIC | Facility: CLINIC | Age: 79
End: 2024-08-27
Payer: MEDICARE

## 2024-08-27 VITALS
DIASTOLIC BLOOD PRESSURE: 79 MMHG | HEART RATE: 51 BPM | WEIGHT: 167.1 LBS | RESPIRATION RATE: 18 BRPM | TEMPERATURE: 98.2 F | HEIGHT: 62 IN | OXYGEN SATURATION: 98 % | BODY MASS INDEX: 30.75 KG/M2 | SYSTOLIC BLOOD PRESSURE: 179 MMHG

## 2024-08-27 DIAGNOSIS — Z76.89 ESTABLISHING CARE WITH NEW DOCTOR, ENCOUNTER FOR: ICD-10-CM

## 2024-08-27 DIAGNOSIS — E66.09 CLASS 1 OBESITY DUE TO EXCESS CALORIES WITH SERIOUS COMORBIDITY AND BODY MASS INDEX (BMI) OF 30.0 TO 30.9 IN ADULT: ICD-10-CM

## 2024-08-27 DIAGNOSIS — R73.9 HYPERGLYCEMIA: ICD-10-CM

## 2024-08-27 DIAGNOSIS — I10 ESSENTIAL HYPERTENSION WITH GOAL BLOOD PRESSURE LESS THAN 140/90: Primary | Chronic | ICD-10-CM

## 2024-08-27 DIAGNOSIS — Z13.820 SCREENING FOR OSTEOPOROSIS: ICD-10-CM

## 2024-08-27 DIAGNOSIS — E11.59 TYPE 2 DIABETES MELLITUS WITH VASCULAR DISEASE (HCC): ICD-10-CM

## 2024-08-27 DIAGNOSIS — Z78.0 POST-MENOPAUSAL: ICD-10-CM

## 2024-08-27 PROCEDURE — 3078F DIAST BP <80 MM HG: CPT

## 2024-08-27 PROCEDURE — 3077F SYST BP >= 140 MM HG: CPT

## 2024-08-27 PROCEDURE — 99213 OFFICE O/P EST LOW 20 MIN: CPT

## 2024-08-27 PROCEDURE — 1123F ACP DISCUSS/DSCN MKR DOCD: CPT

## 2024-08-27 SDOH — ECONOMIC STABILITY: FOOD INSECURITY: WITHIN THE PAST 12 MONTHS, YOU WORRIED THAT YOUR FOOD WOULD RUN OUT BEFORE YOU GOT MONEY TO BUY MORE.: PATIENT DECLINED

## 2024-08-27 SDOH — HEALTH STABILITY: PHYSICAL HEALTH: ON AVERAGE, HOW MANY DAYS PER WEEK DO YOU ENGAGE IN MODERATE TO STRENUOUS EXERCISE (LIKE A BRISK WALK)?: 5 DAYS

## 2024-08-27 SDOH — ECONOMIC STABILITY: INCOME INSECURITY: HOW HARD IS IT FOR YOU TO PAY FOR THE VERY BASICS LIKE FOOD, HOUSING, MEDICAL CARE, AND HEATING?: SOMEWHAT HARD

## 2024-08-27 SDOH — HEALTH STABILITY: PHYSICAL HEALTH: ON AVERAGE, HOW MANY MINUTES DO YOU ENGAGE IN EXERCISE AT THIS LEVEL?: 60 MIN

## 2024-08-27 SDOH — ECONOMIC STABILITY: FOOD INSECURITY: WITHIN THE PAST 12 MONTHS, THE FOOD YOU BOUGHT JUST DIDN'T LAST AND YOU DIDN'T HAVE MONEY TO GET MORE.: PATIENT DECLINED

## 2024-08-27 ASSESSMENT — SOCIAL DETERMINANTS OF HEALTH (SDOH)

## 2024-08-27 ASSESSMENT — PATIENT HEALTH QUESTIONNAIRE - PHQ9
SUM OF ALL RESPONSES TO PHQ9 QUESTIONS 1 & 2: 2
2. FEELING DOWN, DEPRESSED OR HOPELESS: SEVERAL DAYS
SUM OF ALL RESPONSES TO PHQ QUESTIONS 1-9: 2
SUM OF ALL RESPONSES TO PHQ QUESTIONS 1-9: 2
1. LITTLE INTEREST OR PLEASURE IN DOING THINGS: SEVERAL DAYS
SUM OF ALL RESPONSES TO PHQ QUESTIONS 1-9: 2
SUM OF ALL RESPONSES TO PHQ QUESTIONS 1-9: 2

## 2024-08-27 NOTE — ASSESSMENT & PLAN NOTE
Unclear control, continue current plan pending work up below and lifestyle modifications recommended  Discussed previous hemoglobin A1c with patient will get repeat check today and discussed possible need for medications with patient we will call with results.  Hemoglobin A1C   Date Value Ref Range Status   11/01/2022 7.2 (H) 4.8 - 5.6 % Final

## 2024-08-27 NOTE — PATIENT INSTRUCTIONS
NORVASC /AMLODAPINE 5mg - double check you have at home if not call cardiology for refill.     Patient Education        DASH Diet: Care Instructions  Your Care Instructions     The DASH diet is an eating plan that can help lower your blood pressure. DASH stands for Dietary Approaches to Stop Hypertension. Hypertension is high blood pressure.  The DASH diet focuses on eating foods that are high in calcium, potassium, and magnesium. These nutrients can lower blood pressure. The foods that are highest in these nutrients are fruits, vegetables, low-fat dairy products, nuts, seeds, and legumes. But taking calcium, potassium, and magnesium supplements instead of eating foods that are high in those nutrients does not have the same effect. The DASH diet also includes whole grains, fish, and poultry.  The DASH diet is one of several lifestyle changes your doctor may recommend to lower your high blood pressure. Your doctor may also want you to decrease the amount of sodium in your diet. Lowering sodium while following the DASH diet can lower blood pressure even further than just the DASH diet alone.  Follow-up care is a key part of your treatment and safety. Be sure to make and go to all appointments, and call your doctor if you are having problems. It's also a good idea to know your test results and keep a list of the medicines you take.  How can you care for yourself at home?  Following the DASH diet  Eat 4 to 5 servings of fruit each day. A serving is 1 medium-sized piece of fruit, 1/2 cup raw or canned fruit, 1/4 cup dried fruit, or 4 ounces (1/2 cup) of fruit juice. Choose fruit more often than fruit juice.  Eat 4 to 5 servings of vegetables each day. A serving is 1 cup of lettuce or raw leafy vegetables, 1/2 cup of chopped or cooked vegetables, or 4 ounces (1/2 cup) of vegetable juice. Choose vegetables more often than vegetable juice.  Get 2 to 3 servings of low-fat and fat-free dairy each day. A serving is 8 ounces of

## 2024-08-27 NOTE — PROGRESS NOTES
She will contact if she could get she [facility]  [address]  Establish management when [place] [address]  Phone: [phone] Fax [phone]  Alie Eduardo MD      Date of Service: 8/27/2024    Patient: Cathryn Baldwin  1945 78 y.o. female,New patient, here for evaluation of the following chief complaint(s):      Chief complaint:   Chief Complaint   Patient presents with    New Patient     New Patient - Here to establish.        HISTORY OF PRESENTING ILLNESS     Cathryn Baldwin is a 78 y.o. female presented to the clinic to establish with me.   Patient is here to establish care-patient's medical, family, surgical, social history were reviewed.  Patient has no acute concerns today states that she is feeling well.    Patient states that she is following with cardiology and they \"said her BP is fine \"so she is fine and  doesn't want to make any medication changes.     Hypertension:  Patient is here for follow up chronic hypertension.  This is not generally controlled on current medication regimen.  BP today is 179/79,.  Patient is not monitoring BP at home- patient states that she does not see the point in . Takes meds as directed and tolerates them well.  Most recent labs reviewed with patient and are not remarkable.  No symptoms from htn standpoint per ROS.  Patient is  compliant with lifestyle modifications. Low salt diet yes  exercise - works in the garden alot , diet - cooks at home , fish , garlic  Patient does not smoke.     Refused surgery after having 2 cardiac blocks.   Has a history of cardiac stents     Patient also explains she is a DNR.  She declines coming in for her annual well visit so that she does not need that.  Patient also made it very clear that she is already told the cardiologist if she ever has another heart blockage she would not undergo any type of surgeries in all neurologic events  her vaccines and if she goes to the pharmacy has already asked to get the RSV and flu vaccine.    Counseled regarding above diagnosis, including possible risks and complications, especially if left uncontrolled. Counseled regarding the possible side effects, risks, benefits and alternatives to treatment; patient and/or guardian verbalizes understanding, agrees, feels comfortable with and wishes to proceed with above treatment plan.    Call or go to ED immediately if symptoms worsen or persist. Advised patient to call with any new medication issues, and, as applicable, read all Rx info from pharmacy to assure aware of all possible risks and side effects of medication before taking.     Patient and/or guardian given opportunity to ask questions/raise concerns.The patient verbalized comfort and understanding of instructions.    I encourage further reading and education about your health conditions.Information on many health conditions is provided by the American Academy of [employer]icians: https://familydoctor.org/  Please bring any questions to me at your next visit.    Medication List:    Current Outpatient Medications   Medication Sig Dispense Refill    Multiple Vitamins-Minerals (MULTIVITAMIN ADULTS PO) Take 1 tablet by mouth daily      metoprolol succinate (TOPROL XL) 50 MG extended release tablet Take 1 tablet by mouth daily 90 tablet 3    fenofibrate (TRIGLIDE) 160 MG tablet TAKE 1 TABLET BY MOUTH EVERY DAY 90 tablet 1    Cyanocobalamin (VITAMIN B 12) 500 MCG TABS Take by mouth      vitamin E 400 UNIT capsule Take 1 capsule by mouth daily      b complex vitamins capsule Take 1 capsule by mouth daily      Evolocumab 140 MG/ML SOAJ Inject 140 mg into the skin every 14 days 6 Adjustable Dose Pre-filled Pen Syringe 3    isosorbide mononitrate (IMDUR) 30 MG extended release tablet Take 1 tablet by mouth daily 90 tablet 3    omeprazole (PRILOSEC) 40 MG delayed release capsule TAKE 1 CAPSULE BY MOUTH EVERY DAY 30 MIN

## 2024-08-27 NOTE — ASSESSMENT & PLAN NOTE
Uncontrolled, medication adherence emphasized, lifestyle modifications recommended, and followed by specialist cardiology  After patient BP log kindly declined measuring her blood pressure at home and she is that she has nothing else to do  Patient said that she is following with cardiology in 2 months again and she is waiting for metoprolol which she increased from 25 to 50 mg to help decrease her blood pressure.  Patient brought in her medications today did not have Norvasc with her patient will go home and double check that she is taking it otherwise strongly encourage patient to resume her Norvasc 5 mg daily as directed

## 2024-08-29 ENCOUNTER — LAB (OUTPATIENT)
Dept: FAMILY MEDICINE CLINIC | Facility: CLINIC | Age: 79
End: 2024-08-29

## 2024-08-29 DIAGNOSIS — R73.9 HYPERGLYCEMIA: ICD-10-CM

## 2024-08-29 DIAGNOSIS — I10 ESSENTIAL HYPERTENSION WITH GOAL BLOOD PRESSURE LESS THAN 140/90: Chronic | ICD-10-CM

## 2024-08-29 LAB
EST. AVERAGE GLUCOSE BLD GHB EST-MCNC: 208 MG/DL
HBA1C MFR BLD: 8.9 % (ref 0–5.6)

## 2024-08-29 RX ORDER — AMLODIPINE BESYLATE 5 MG/1
5 TABLET ORAL DAILY
Qty: 30 TABLET | Refills: 11 | Status: SHIPPED | OUTPATIENT
Start: 2024-08-29

## 2024-08-29 NOTE — TELEPHONE ENCOUNTER
Pt stated that her PCP wanted her to reach out to  about her Amlodipine, She has not  been taking it and her PCP said she would be and would like  to send in a refill       MEDICATION REFILL REQUEST      Name of Medication:  Amlodipine  Dose:  5mg  Frequency:  once daily  Quantity:  30  Days' supply:  60      Pharmacy Name/Location:  [facility]

## 2024-08-30 NOTE — RESULT ENCOUNTER NOTE
Please notify Cathryn Lelothomas Baldwin , that she has an abnormal result: hemoglobin A1c is 8.9 which is much more elevated from last year at 7.2 Please offer patient a visit ( VV is ok ) to discuss starting medications for her diabetes.   Please encourage patient to continue with plan as discussed in the office, please  continue to increase physical activity as able and decrease sweets, carbs, high cholesterol containing foods in the diet.  ~ Thank you

## 2024-09-03 ENCOUNTER — TELEPHONE (OUTPATIENT)
Age: 79
End: 2024-09-03

## 2024-10-23 RX ORDER — FENOFIBRATE 160 MG/1
160 TABLET ORAL DAILY
Qty: 90 TABLET | Refills: 1 | Status: SHIPPED | OUTPATIENT
Start: 2024-10-23 | End: 2024-10-24 | Stop reason: SDUPTHER

## 2024-10-24 ENCOUNTER — OFFICE VISIT (OUTPATIENT)
Age: 79
End: 2024-10-24
Payer: MEDICARE

## 2024-10-24 VITALS
DIASTOLIC BLOOD PRESSURE: 78 MMHG | SYSTOLIC BLOOD PRESSURE: 122 MMHG | HEIGHT: 61 IN | WEIGHT: 166.8 LBS | BODY MASS INDEX: 31.49 KG/M2 | HEART RATE: 72 BPM

## 2024-10-24 DIAGNOSIS — Z95.1 S/P CABG X 4: Chronic | ICD-10-CM

## 2024-10-24 DIAGNOSIS — E78.00 PURE HYPERCHOLESTEROLEMIA: Chronic | ICD-10-CM

## 2024-10-24 DIAGNOSIS — I10 ESSENTIAL HYPERTENSION WITH GOAL BLOOD PRESSURE LESS THAN 140/90: Chronic | ICD-10-CM

## 2024-10-24 DIAGNOSIS — I25.118 CORONARY ARTERY DISEASE OF NATIVE ARTERY OF NATIVE HEART WITH STABLE ANGINA PECTORIS (HCC): Chronic | ICD-10-CM

## 2024-10-24 DIAGNOSIS — I50.22 CHRONIC SYSTOLIC (CONGESTIVE) HEART FAILURE (HCC): Chronic | ICD-10-CM

## 2024-10-24 DIAGNOSIS — I65.23 BILATERAL CAROTID ARTERY STENOSIS: Primary | Chronic | ICD-10-CM

## 2024-10-24 PROBLEM — I20.0 ACCELERATING ANGINA (HCC): Status: RESOLVED | Noted: 2022-12-07 | Resolved: 2024-10-24

## 2024-10-24 PROCEDURE — G8417 CALC BMI ABV UP PARAM F/U: HCPCS | Performed by: INTERNAL MEDICINE

## 2024-10-24 PROCEDURE — 99214 OFFICE O/P EST MOD 30 MIN: CPT | Performed by: INTERNAL MEDICINE

## 2024-10-24 PROCEDURE — G8400 PT W/DXA NO RESULTS DOC: HCPCS | Performed by: INTERNAL MEDICINE

## 2024-10-24 PROCEDURE — 3074F SYST BP LT 130 MM HG: CPT | Performed by: INTERNAL MEDICINE

## 2024-10-24 PROCEDURE — 3078F DIAST BP <80 MM HG: CPT | Performed by: INTERNAL MEDICINE

## 2024-10-24 PROCEDURE — 1123F ACP DISCUSS/DSCN MKR DOCD: CPT | Performed by: INTERNAL MEDICINE

## 2024-10-24 PROCEDURE — 1036F TOBACCO NON-USER: CPT | Performed by: INTERNAL MEDICINE

## 2024-10-24 PROCEDURE — G8484 FLU IMMUNIZE NO ADMIN: HCPCS | Performed by: INTERNAL MEDICINE

## 2024-10-24 PROCEDURE — 1159F MED LIST DOCD IN RCRD: CPT | Performed by: INTERNAL MEDICINE

## 2024-10-24 PROCEDURE — 1090F PRES/ABSN URINE INCON ASSESS: CPT | Performed by: INTERNAL MEDICINE

## 2024-10-24 PROCEDURE — G8427 DOCREV CUR MEDS BY ELIG CLIN: HCPCS | Performed by: INTERNAL MEDICINE

## 2024-10-24 PROCEDURE — 1126F AMNT PAIN NOTED NONE PRSNT: CPT | Performed by: INTERNAL MEDICINE

## 2024-10-24 RX ORDER — OMEPRAZOLE 40 MG/1
CAPSULE, DELAYED RELEASE ORAL
Qty: 90 CAPSULE | Refills: 3 | Status: SHIPPED | OUTPATIENT
Start: 2024-10-24

## 2024-10-24 RX ORDER — FENOFIBRATE 160 MG/1
160 TABLET ORAL DAILY
Qty: 90 TABLET | Refills: 3 | Status: SHIPPED | OUTPATIENT
Start: 2024-10-24

## 2024-10-24 RX ORDER — AMLODIPINE BESYLATE 5 MG/1
5 TABLET ORAL DAILY
Qty: 90 TABLET | Refills: 3 | Status: SHIPPED | OUTPATIENT
Start: 2024-10-24

## 2024-10-24 RX ORDER — METOPROLOL SUCCINATE 50 MG/1
50 TABLET, EXTENDED RELEASE ORAL DAILY
Qty: 90 TABLET | Refills: 3 | Status: SHIPPED | OUTPATIENT
Start: 2024-10-24

## 2024-10-24 RX ORDER — ISOSORBIDE MONONITRATE 30 MG/1
30 TABLET, EXTENDED RELEASE ORAL DAILY
Qty: 90 TABLET | Refills: 3 | Status: SHIPPED | OUTPATIENT
Start: 2024-10-24

## 2024-10-24 RX ORDER — FAMOTIDINE 40 MG/1
40 TABLET, FILM COATED ORAL
Qty: 90 TABLET | Refills: 3 | Status: SHIPPED | OUTPATIENT
Start: 2024-10-24 | End: 2025-10-19

## 2024-10-24 NOTE — PROGRESS NOTES
10/23/2023    HCT 40.1 10/23/2023    MCV 91.8 10/23/2023     10/23/2023       No results found for: \"TSHFT4\", \"TSH\"    Lab Results   Component Value Date    LABA1C 8.9 (H) 08/29/2024     Lab Results   Component Value Date     08/29/2024       Lab Results   Component Value Date    CHOL 110 03/27/2024    CHOL 323 (H) 10/23/2023    CHOL 226 (H) 11/01/2022     Lab Results   Component Value Date    TRIG 152 (H) 03/27/2024    TRIG 706 (H) 10/23/2023    TRIG 591 (H) 11/01/2022     Lab Results   Component Value Date    HDL 52 03/27/2024    HDL 39 (L) 10/23/2023    HDL 37 (L) 11/01/2022     No components found for: \"LDLCHOLESTEROL\", \"LDLCALC\"  Lab Results   Component Value Date    VLDL 30.4 (H) 03/27/2024    VLDL 141.2 (H) 10/23/2023    VLDL 118.2 (H) 11/01/2022     Lab Results   Component Value Date    CHOLHDLRATIO 2.1 03/27/2024    CHOLHDLRATIO 8.3 10/23/2023    CHOLHDLRATIO 6.1 11/01/2022     Lab Results   Component Value Date    LDL 27.6 03/27/2024    LDLDIRECT 109 (H) 10/23/2023           11/30/22    TRANSTHORACIC ECHOCARDIOGRAM (TTE) COMPLETE (CONTRAST/BUBBLE/3D PRN) 12/01/2022  1:50 PM, 12/01/2022 12:00 AM (Final)    Interpretation Summary    Left Ventricle: Low normal left ventricular systolic function with a visually estimated EF of 50 - 55%. Left ventricle size is normal. Normal wall thickness. Normal wall motion. Abnormal diastolic function.    Aortic Valve: Mild sclerosis of the aortic valve cusp.    Mitral Valve: Valve structure is normal. Mild annular calcification of the mitral valve. Mild regurgitation.    Tricuspid Valve: Mild regurgitation. The estimated RVSP is 31 mmHg.    Signed by: Hair Shannon MD on 12/1/2022  1:50 PM, Signed by: Unknown Provider Result on 12/1/2022 12:00 AM        I have Independently reviewed prior care notes, any ER records available, cardiac testing, labs and results with the patient and before seeing the patient today.  Also independently reviewed outside

## 2024-11-11 RX ORDER — EVOLOCUMAB 140 MG/ML
INJECTION, SOLUTION SUBCUTANEOUS
Qty: 2 ADJUSTABLE DOSE PRE-FILLED PEN SYRINGE | Refills: 11 | Status: SHIPPED | OUTPATIENT
Start: 2024-11-11

## 2025-02-28 ENCOUNTER — OFFICE VISIT (OUTPATIENT)
Dept: FAMILY MEDICINE CLINIC | Facility: CLINIC | Age: 80
End: 2025-02-28
Payer: MEDICARE

## 2025-02-28 VITALS
TEMPERATURE: 98.2 F | DIASTOLIC BLOOD PRESSURE: 67 MMHG | HEART RATE: 57 BPM | WEIGHT: 169.7 LBS | SYSTOLIC BLOOD PRESSURE: 173 MMHG | OXYGEN SATURATION: 97 % | HEIGHT: 61 IN | BODY MASS INDEX: 32.04 KG/M2

## 2025-02-28 DIAGNOSIS — E11.59 TYPE 2 DIABETES MELLITUS WITH VASCULAR DISEASE (HCC): Primary | Chronic | ICD-10-CM

## 2025-02-28 DIAGNOSIS — E66.811 CLASS 1 OBESITY DUE TO EXCESS CALORIES WITH SERIOUS COMORBIDITY AND BODY MASS INDEX (BMI) OF 30.0 TO 30.9 IN ADULT: ICD-10-CM

## 2025-02-28 DIAGNOSIS — Z53.20 RECOMMENDATION REFUSED BY PATIENT: ICD-10-CM

## 2025-02-28 DIAGNOSIS — Z53.20 STATIN MEDICATION DECLINED BY PATIENT: ICD-10-CM

## 2025-02-28 DIAGNOSIS — Z53.20 PATIENT DECLINES TO TAKE MEDICATION: ICD-10-CM

## 2025-02-28 DIAGNOSIS — E11.59 TYPE 2 DIABETES MELLITUS WITH VASCULAR DISEASE (HCC): Chronic | ICD-10-CM

## 2025-02-28 DIAGNOSIS — E78.2 MIXED HYPERLIPIDEMIA: ICD-10-CM

## 2025-02-28 DIAGNOSIS — I10 ESSENTIAL HYPERTENSION WITH GOAL BLOOD PRESSURE LESS THAN 140/90: Chronic | ICD-10-CM

## 2025-02-28 DIAGNOSIS — E66.09 CLASS 1 OBESITY DUE TO EXCESS CALORIES WITH SERIOUS COMORBIDITY AND BODY MASS INDEX (BMI) OF 30.0 TO 30.9 IN ADULT: ICD-10-CM

## 2025-02-28 LAB
ALBUMIN SERPL-MCNC: 3.9 G/DL (ref 3.2–4.6)
ALBUMIN/GLOB SERPL: 1 (ref 1–1.9)
ALP SERPL-CCNC: 72 U/L (ref 35–104)
ALT SERPL-CCNC: 24 U/L (ref 8–45)
ANION GAP SERPL CALC-SCNC: 9 MMOL/L (ref 7–16)
AST SERPL-CCNC: 22 U/L (ref 15–37)
BASOPHILS # BLD: 0.04 K/UL (ref 0–0.2)
BASOPHILS NFR BLD: 0.7 % (ref 0–2)
BILIRUB SERPL-MCNC: <0.2 MG/DL (ref 0–1.2)
BUN SERPL-MCNC: 21 MG/DL (ref 8–23)
CALCIUM SERPL-MCNC: 9.8 MG/DL (ref 8.8–10.2)
CHLORIDE SERPL-SCNC: 102 MMOL/L (ref 98–107)
CHOLEST SERPL-MCNC: 215 MG/DL (ref 0–200)
CO2 SERPL-SCNC: 26 MMOL/L (ref 20–29)
CREAT SERPL-MCNC: 0.88 MG/DL (ref 0.6–1.1)
DIFFERENTIAL METHOD BLD: ABNORMAL
EOSINOPHIL # BLD: 0.15 K/UL (ref 0–0.8)
EOSINOPHIL NFR BLD: 2.4 % (ref 0.5–7.8)
ERYTHROCYTE [DISTWIDTH] IN BLOOD BY AUTOMATED COUNT: 13.4 % (ref 11.9–14.6)
EST. AVERAGE GLUCOSE BLD GHB EST-MCNC: 232 MG/DL
GLOBULIN SER CALC-MCNC: 3.8 G/DL (ref 2.3–3.5)
GLUCOSE SERPL-MCNC: 267 MG/DL (ref 70–99)
HBA1C MFR BLD: 9.7 % (ref 0–5.6)
HCT VFR BLD AUTO: 39.5 % (ref 35.8–46.3)
HDLC SERPL-MCNC: 43 MG/DL (ref 40–60)
HDLC SERPL: 5.1 (ref 0–5)
HGB BLD-MCNC: 13 G/DL (ref 11.7–15.4)
IMM GRANULOCYTES # BLD AUTO: 0.02 K/UL (ref 0–0.5)
IMM GRANULOCYTES NFR BLD AUTO: 0.3 % (ref 0–5)
LDLC SERPL CALC-MCNC: 127 MG/DL (ref 0–100)
LYMPHOCYTES # BLD: 2.51 K/UL (ref 0.5–4.6)
LYMPHOCYTES NFR BLD: 40.9 % (ref 13–44)
MCH RBC QN AUTO: 28.7 PG (ref 26.1–32.9)
MCHC RBC AUTO-ENTMCNC: 32.9 G/DL (ref 31.4–35)
MCV RBC AUTO: 87.2 FL (ref 82–102)
MONOCYTES # BLD: 0.58 K/UL (ref 0.1–1.3)
MONOCYTES NFR BLD: 9.4 % (ref 4–12)
NEUTS SEG # BLD: 2.84 K/UL (ref 1.7–8.2)
NEUTS SEG NFR BLD: 46.3 % (ref 43–78)
NRBC # BLD: 0 K/UL (ref 0–0.2)
PLATELET # BLD AUTO: 201 K/UL (ref 150–450)
PMV BLD AUTO: 12.6 FL (ref 9.4–12.3)
POTASSIUM SERPL-SCNC: 4.3 MMOL/L (ref 3.5–5.1)
PROT SERPL-MCNC: 7.7 G/DL (ref 6.3–8.2)
RBC # BLD AUTO: 4.53 M/UL (ref 4.05–5.2)
SODIUM SERPL-SCNC: 138 MMOL/L (ref 136–145)
TRIGL SERPL-MCNC: 226 MG/DL (ref 0–150)
VLDLC SERPL CALC-MCNC: 45 MG/DL (ref 6–23)
WBC # BLD AUTO: 6.1 K/UL (ref 4.3–11.1)

## 2025-02-28 PROCEDURE — 3078F DIAST BP <80 MM HG: CPT

## 2025-02-28 PROCEDURE — 1123F ACP DISCUSS/DSCN MKR DOCD: CPT

## 2025-02-28 PROCEDURE — 99215 OFFICE O/P EST HI 40 MIN: CPT

## 2025-02-28 PROCEDURE — 1090F PRES/ABSN URINE INCON ASSESS: CPT

## 2025-02-28 PROCEDURE — G8427 DOCREV CUR MEDS BY ELIG CLIN: HCPCS

## 2025-02-28 PROCEDURE — G8417 CALC BMI ABV UP PARAM F/U: HCPCS

## 2025-02-28 PROCEDURE — G8400 PT W/DXA NO RESULTS DOC: HCPCS

## 2025-02-28 PROCEDURE — 3046F HEMOGLOBIN A1C LEVEL >9.0%: CPT

## 2025-02-28 PROCEDURE — 1036F TOBACCO NON-USER: CPT

## 2025-02-28 PROCEDURE — 1159F MED LIST DOCD IN RCRD: CPT

## 2025-02-28 PROCEDURE — 3077F SYST BP >= 140 MM HG: CPT

## 2025-02-28 ASSESSMENT — PATIENT HEALTH QUESTIONNAIRE - PHQ9
1. LITTLE INTEREST OR PLEASURE IN DOING THINGS: NOT AT ALL
2. FEELING DOWN, DEPRESSED OR HOPELESS: NOT AT ALL
SUM OF ALL RESPONSES TO PHQ QUESTIONS 1-9: 0
SUM OF ALL RESPONSES TO PHQ9 QUESTIONS 1 & 2: 0
SUM OF ALL RESPONSES TO PHQ QUESTIONS 1-9: 0

## 2025-02-28 NOTE — PROGRESS NOTES
[facility]  [address]  Establish management when she [place] [address]  Phone: [phone] Fax [phone]  Alie Eduardo MD      Date of Service: 2/28/2025    Patient: Cathryn Baldwin  1945 79 y.o. female,New patient, here for evaluation of the following chief complaint(s):      Chief complaint:   Chief Complaint   Patient presents with    Hypertension     Here for a 6 month follow up for hypertension - non fasting    Insomnia     Would like to discuss insomnia - states she does not get enough sleep at night       HISTORY OF PRESENTING ILLNESS     Cathryn Baldwin is a 79 y.o. female presented to the clinic for BP, weight.     Decreased sleep   Sometimes she sleeps well and other days she doesn't   Goes to bed at 9,11,12 pm but then wakes up at 2am and then 4 am.         Diabetes:  Type 2  Chronic issue, present for years  Patient feels well.    Issue is not controlled.    Patient does not have complaints or concerns today.    Glucose screens being checked  no  Fasting glucose readings? N/a  Post-prandial glucose readings? No   Medications reviewed.  Currently on nothing   Are you taking your medications every day?  Patient repeatedly Refusing not wanting to start any medications states that until she has 'diabetes diabetes' she will not start any medications, educated patient on her hemoglobin A1c's and how they have been elevating over time and asked her why she thought she does not have diabetes and she stated she does not have any diabetes  Any hypoglycemic episodes no  Patient is reporting none.     Denies other Ssx  Any numbness or tingling in hands or feet? no  Any noticeable wounds on your feet? no  Patient does not see Podiatry regularly.    Have you had an eye exam in the last year? no  Patient is taking ASA Yes  Taking  Ace Inhibitor/ARB. No:   Patient is not on appropriately-dosed statin.  Patient offered Lipitor

## 2025-02-28 NOTE — RESULT ENCOUNTER NOTE
Please notify Cathryn Lelothomas Dodsonrobbie , that she has an abnormal result: Her diabetes is extremely uncontrolled at a hemoglobin A1c of 9.7 please consider will be coming in to discuss starting diabetic medication as very high hemoglobin A1c such as this will soon need insulin to be treated patient initially had 6 months follow-up for annual well visit please change to 3 months with a 3-month lab repeat 5 days before if patient is not willing to come in sooner to start diabetic medications.  Patient's cholesterol and triglycerides have both increased. CBC within normal limits.  Please encourage patient to continue with plan as discussed in the office, please  continue to increase physical activity as able and decrease sweets, carbs, high cholesterol containing foods in the diet.    ~ Thank you

## 2025-02-28 NOTE — ASSESSMENT & PLAN NOTE
Chronic, not at goal (unstable), offered and discussed multiple medications, risks, benefits, patient declines, states she does NOT have diabetes, I discussed and showed patient all of her hba1c levels and how they have been raising  and lifestyle modifications recommended  Offered endo referral - declined   Offered DM education - declined   Peripheral neuropathy is not present. Regular foot exams encouraged. Encourage to monitor for foot wounds, good foot wear.   Vision changes are not present. Yearly eye exam encouraged.  Patient  is not on ACEI for kidney protection, - ordered albumin creatine ratio  Patient encouraged to increase physical activity as able, increase fruits and vegetables, decrease sweets and carbs.  Advised on consequences of uncontrolled diabetes. Pathophysiology discussed. Advised that without intervention, premature retinal disease (including blindness), vascular disease (including strokes, heart disease, or peripheral vascular disease) could result, renal failure, and nerve damage may develop.  Most importantly, advised that premature death may result.     Patient agrees and understands the importance of DM management, still states she doesn't want to depend on medication and will talk to cardiologist.     Orders:    Albumin/Creatinine Ratio, Urine; Future    Hemoglobin A1C; Future    Hemoglobin A1C; Future    Comprehensive Metabolic Panel; Future

## 2025-02-28 NOTE — ASSESSMENT & PLAN NOTE
Uncontrolled, medication adherence emphasized, lifestyle modifications recommended, and followed by specialist cardiology  Patient said that she is following with cardiology in 1 months will only listen to his recommendation.   Offered BP log - patient declined, said she knows her bp is well controlled at home.   Further recommendations pending results      Orders:    Comprehensive Metabolic Panel; Future    CBC with Auto Differential; Future    Lipid Panel; Future

## 2025-03-01 NOTE — ASSESSMENT & PLAN NOTE
Chronic, not at goal (unstable),  , medication adherence emphasized, and lifestyle modifications recommended  Patient stopped her repatha- educated her on medication compliance.   Patient only   Patient encouraged to increase physical activity as able, increase fruits and vegetables, decrease sweets and carbs, cholesterol containing foods .    Orders:    Comprehensive Metabolic Panel; Future    CBC with Auto Differential; Future    Lipid Panel; Future

## 2025-03-06 ENCOUNTER — OFFICE VISIT (OUTPATIENT)
Age: 80
End: 2025-03-06
Payer: MEDICARE

## 2025-03-06 VITALS
HEIGHT: 62 IN | BODY MASS INDEX: 30.91 KG/M2 | WEIGHT: 168 LBS | DIASTOLIC BLOOD PRESSURE: 84 MMHG | SYSTOLIC BLOOD PRESSURE: 158 MMHG | HEART RATE: 64 BPM

## 2025-03-06 DIAGNOSIS — I10 ESSENTIAL HYPERTENSION WITH GOAL BLOOD PRESSURE LESS THAN 140/90: Chronic | ICD-10-CM

## 2025-03-06 DIAGNOSIS — I25.118 CORONARY ARTERY DISEASE OF NATIVE ARTERY OF NATIVE HEART WITH STABLE ANGINA PECTORIS: Chronic | ICD-10-CM

## 2025-03-06 DIAGNOSIS — I50.22 CHRONIC SYSTOLIC (CONGESTIVE) HEART FAILURE (HCC): Primary | Chronic | ICD-10-CM

## 2025-03-06 DIAGNOSIS — Z95.1 S/P CABG X 4: Chronic | ICD-10-CM

## 2025-03-06 DIAGNOSIS — I65.23 BILATERAL CAROTID ARTERY STENOSIS: Chronic | ICD-10-CM

## 2025-03-06 DIAGNOSIS — E78.00 PURE HYPERCHOLESTEROLEMIA: Chronic | ICD-10-CM

## 2025-03-06 PROCEDURE — 1090F PRES/ABSN URINE INCON ASSESS: CPT | Performed by: INTERNAL MEDICINE

## 2025-03-06 PROCEDURE — G8427 DOCREV CUR MEDS BY ELIG CLIN: HCPCS | Performed by: INTERNAL MEDICINE

## 2025-03-06 PROCEDURE — 3077F SYST BP >= 140 MM HG: CPT | Performed by: INTERNAL MEDICINE

## 2025-03-06 PROCEDURE — 1036F TOBACCO NON-USER: CPT | Performed by: INTERNAL MEDICINE

## 2025-03-06 PROCEDURE — 1123F ACP DISCUSS/DSCN MKR DOCD: CPT | Performed by: INTERNAL MEDICINE

## 2025-03-06 PROCEDURE — 1126F AMNT PAIN NOTED NONE PRSNT: CPT | Performed by: INTERNAL MEDICINE

## 2025-03-06 PROCEDURE — G8400 PT W/DXA NO RESULTS DOC: HCPCS | Performed by: INTERNAL MEDICINE

## 2025-03-06 PROCEDURE — 1159F MED LIST DOCD IN RCRD: CPT | Performed by: INTERNAL MEDICINE

## 2025-03-06 PROCEDURE — 3079F DIAST BP 80-89 MM HG: CPT | Performed by: INTERNAL MEDICINE

## 2025-03-06 PROCEDURE — 99214 OFFICE O/P EST MOD 30 MIN: CPT | Performed by: INTERNAL MEDICINE

## 2025-03-06 PROCEDURE — G8417 CALC BMI ABV UP PARAM F/U: HCPCS | Performed by: INTERNAL MEDICINE

## 2025-03-06 RX ORDER — ISOSORBIDE MONONITRATE 30 MG/1
30 TABLET, EXTENDED RELEASE ORAL DAILY
Qty: 90 TABLET | Refills: 3 | Status: SHIPPED | OUTPATIENT
Start: 2025-03-06

## 2025-03-06 RX ORDER — ROSUVASTATIN CALCIUM 20 MG/1
20 TABLET, COATED ORAL DAILY
Qty: 90 TABLET | Refills: 1 | Status: SHIPPED | OUTPATIENT
Start: 2025-03-06

## 2025-03-06 NOTE — PROGRESS NOTES
06/17/2016    Dyspnea on exertion 06/17/2016    GERD (gastroesophageal reflux disease) 06/17/2016    Colon cancer screening 06/17/2016      Past Surgical History:   Procedure Laterality Date    APPENDECTOMY      BREAST LUMPECTOMY Left 2005    CARDIAC PROCEDURE N/A 12/13/2022    LEFT HEART CATH / CORONARY ANGIOGRAPHY W GRAFTS performed by Gabriel Ch MD at [facility] CARDIAC CATH LAB    CARDIAC VALVE SURGERY  2006    CORONARY ARTERY BYPASS GRAFT  7/27/16    x 4 vessels, Dr. Gonzales Jackson    HYSTERECTOMY (CERVIX STATUS UNKNOWN)      total hysterectomy     No family history on file.  Social History     Tobacco Use    Smoking status: Never     Passive exposure: Never    Smokeless tobacco: Never   Substance Use Topics    Alcohol use: Yes     Alcohol/week: 2.0 standard drinks of alcohol     Types: 1 Cans of beer, 1 Standard drinks or equivalent per week     Comment: social drinker         ROS:    No obvious pertinent positives on review of systems except for what was outlined in the HPI today.      PHYSICAL EXAM:     BP (!) 158/84   Pulse 64   Ht 1.575 m (5' 2\")   Wt 76.2 kg (168 lb)   LMP  (LMP Unknown)   BMI 30.73 kg/m²    General/Constitutional:   Alert and oriented x 3, no acute distress  HEENT:   normocephalic, atraumatic, moist mucous membranes  Neck:   No JVD or carotid bruits bilaterally  Cardiovascular:   regular rate and rhythm, no murmur/rub/gallop appreciated  Pulmonary:   clear to auscultation bilaterally, no respiratory distress  Abdomen:   soft, non-tender, non-distended  Ext:   No sig LE edema bilaterally  Skin:  warm and dry, no obvious rashes seen  Neuro:   no obvious sensory or motor deficits  Psychiatric:   normal mood and affect      Lab Results   Component Value Date/Time     02/28/2025 10:16 AM    K 4.3 02/28/2025 10:16 AM     02/28/2025 10:16 AM    CO2 26 02/28/2025 10:16 AM    BUN 21 02/28/2025 10:16 AM    CREATININE 0.88 02/28/2025 10:16 AM    GLUCOSE 267 02/28/2025 10:16 AM

## 2025-03-28 ENCOUNTER — TELEPHONE (OUTPATIENT)
Age: 80
End: 2025-03-28

## 2025-03-28 NOTE — TELEPHONE ENCOUNTER
Patient reports since starting rosuvastatin, she has experienced a lot of kidney pain and is unable to use the bathroom very much. She stopped the medicine yesterday and is wanting to know what to do.

## 2025-03-28 NOTE — TELEPHONE ENCOUNTER
Since she started on Rosuvastatin has been having problems making it to restroom to urinate on time.When she is seated has a hard time getting up because her lower rt.side hurts.It's like she can not control her urination.Urine is a little dark.She read Rosuvastatin can cause this issue so she has stopped the Rosuvastatin.Her face is dried out and hands are dry as well.All symptoms are new since starting the medication.She was taken off Repatha due to cost.I told her she may could speak w/Repatha help line to see if price of med could be reduced  she said \"no, told me to stop the med and I am not doing anything else unless he tells me to.I asked if she feels she may have a UTI and she says \"no,it's the new medication\"

## 2025-06-11 ENCOUNTER — TELEPHONE (OUTPATIENT)
Age: 80
End: 2025-06-11

## 2025-06-23 ENCOUNTER — LAB (OUTPATIENT)
Dept: FAMILY MEDICINE CLINIC | Facility: CLINIC | Age: 80
End: 2025-06-23

## 2025-06-23 DIAGNOSIS — E11.59 TYPE 2 DIABETES MELLITUS WITH VASCULAR DISEASE (HCC): Chronic | ICD-10-CM

## 2025-06-23 DIAGNOSIS — I25.118 CORONARY ARTERY DISEASE OF NATIVE ARTERY OF NATIVE HEART WITH STABLE ANGINA PECTORIS: Chronic | ICD-10-CM

## 2025-06-23 LAB
ALBUMIN SERPL-MCNC: 4 G/DL (ref 3.2–4.6)
ALBUMIN/GLOB SERPL: 1.1 (ref 1–1.9)
ALP SERPL-CCNC: 68 U/L (ref 35–104)
ALT SERPL-CCNC: 23 U/L (ref 8–45)
ANION GAP SERPL CALC-SCNC: 12 MMOL/L (ref 7–16)
AST SERPL-CCNC: 21 U/L (ref 15–37)
BILIRUB SERPL-MCNC: 0.3 MG/DL (ref 0–1.2)
BUN SERPL-MCNC: 15 MG/DL (ref 8–23)
CALCIUM SERPL-MCNC: 10.1 MG/DL (ref 8.8–10.2)
CHLORIDE SERPL-SCNC: 103 MMOL/L (ref 98–107)
CHOLEST SERPL-MCNC: 231 MG/DL (ref 0–200)
CO2 SERPL-SCNC: 26 MMOL/L (ref 20–29)
CREAT SERPL-MCNC: 0.82 MG/DL (ref 0.6–1.1)
GLOBULIN SER CALC-MCNC: 3.7 G/DL (ref 2.3–3.5)
GLUCOSE SERPL-MCNC: 196 MG/DL (ref 70–99)
HDLC SERPL-MCNC: 50 MG/DL (ref 40–60)
HDLC SERPL: 4.6 (ref 0–5)
LDLC SERPL CALC-MCNC: 138 MG/DL (ref 0–100)
POTASSIUM SERPL-SCNC: 4.5 MMOL/L (ref 3.5–5.1)
PROT SERPL-MCNC: 7.7 G/DL (ref 6.3–8.2)
SODIUM SERPL-SCNC: 140 MMOL/L (ref 136–145)
TRIGL SERPL-MCNC: 214 MG/DL (ref 0–150)
VLDLC SERPL CALC-MCNC: 43 MG/DL (ref 6–23)

## 2025-06-24 DIAGNOSIS — I10 ESSENTIAL HYPERTENSION WITH GOAL BLOOD PRESSURE LESS THAN 140/90: Chronic | ICD-10-CM

## 2025-06-24 DIAGNOSIS — E11.59 TYPE 2 DIABETES MELLITUS WITH VASCULAR DISEASE (HCC): Primary | Chronic | ICD-10-CM

## 2025-07-01 ENCOUNTER — OFFICE VISIT (OUTPATIENT)
Age: 80
End: 2025-07-01
Payer: MEDICARE

## 2025-07-01 VITALS
SYSTOLIC BLOOD PRESSURE: 138 MMHG | BODY MASS INDEX: 30.51 KG/M2 | HEIGHT: 62 IN | DIASTOLIC BLOOD PRESSURE: 60 MMHG | WEIGHT: 165.8 LBS | HEART RATE: 50 BPM

## 2025-07-01 DIAGNOSIS — I50.22 CHRONIC SYSTOLIC (CONGESTIVE) HEART FAILURE (HCC): Chronic | ICD-10-CM

## 2025-07-01 DIAGNOSIS — E78.00 PURE HYPERCHOLESTEROLEMIA: Chronic | ICD-10-CM

## 2025-07-01 DIAGNOSIS — I10 ESSENTIAL HYPERTENSION WITH GOAL BLOOD PRESSURE LESS THAN 140/90: Chronic | ICD-10-CM

## 2025-07-01 DIAGNOSIS — I50.22 CHRONIC SYSTOLIC (CONGESTIVE) HEART FAILURE (HCC): Primary | ICD-10-CM

## 2025-07-01 DIAGNOSIS — I65.23 BILATERAL CAROTID ARTERY STENOSIS: Chronic | ICD-10-CM

## 2025-07-01 DIAGNOSIS — Z95.1 S/P CABG X 4: Chronic | ICD-10-CM

## 2025-07-01 DIAGNOSIS — I25.118 CORONARY ARTERY DISEASE OF NATIVE ARTERY OF NATIVE HEART WITH STABLE ANGINA PECTORIS: ICD-10-CM

## 2025-07-01 PROCEDURE — G8400 PT W/DXA NO RESULTS DOC: HCPCS | Performed by: INTERNAL MEDICINE

## 2025-07-01 PROCEDURE — G8417 CALC BMI ABV UP PARAM F/U: HCPCS | Performed by: INTERNAL MEDICINE

## 2025-07-01 PROCEDURE — 3075F SYST BP GE 130 - 139MM HG: CPT | Performed by: INTERNAL MEDICINE

## 2025-07-01 PROCEDURE — 99214 OFFICE O/P EST MOD 30 MIN: CPT | Performed by: INTERNAL MEDICINE

## 2025-07-01 PROCEDURE — 1123F ACP DISCUSS/DSCN MKR DOCD: CPT | Performed by: INTERNAL MEDICINE

## 2025-07-01 PROCEDURE — 1126F AMNT PAIN NOTED NONE PRSNT: CPT | Performed by: INTERNAL MEDICINE

## 2025-07-01 PROCEDURE — 3078F DIAST BP <80 MM HG: CPT | Performed by: INTERNAL MEDICINE

## 2025-07-01 PROCEDURE — 1159F MED LIST DOCD IN RCRD: CPT | Performed by: INTERNAL MEDICINE

## 2025-07-01 PROCEDURE — 1036F TOBACCO NON-USER: CPT | Performed by: INTERNAL MEDICINE

## 2025-07-01 PROCEDURE — 93000 ELECTROCARDIOGRAM COMPLETE: CPT | Performed by: INTERNAL MEDICINE

## 2025-07-01 PROCEDURE — 1090F PRES/ABSN URINE INCON ASSESS: CPT | Performed by: INTERNAL MEDICINE

## 2025-07-01 PROCEDURE — G8427 DOCREV CUR MEDS BY ELIG CLIN: HCPCS | Performed by: INTERNAL MEDICINE

## 2025-07-01 RX ORDER — PRAVASTATIN SODIUM 40 MG
40 TABLET ORAL DAILY
Qty: 90 TABLET | Refills: 1 | Status: SHIPPED | OUTPATIENT
Start: 2025-07-01

## 2025-07-01 RX ORDER — FENOFIBRATE 160 MG/1
160 TABLET ORAL DAILY
Qty: 90 TABLET | Refills: 3 | Status: SHIPPED | OUTPATIENT
Start: 2025-07-01

## 2025-07-01 RX ORDER — FAMOTIDINE 40 MG/1
40 TABLET, FILM COATED ORAL
Qty: 90 TABLET | Refills: 3 | Status: SHIPPED | OUTPATIENT
Start: 2025-07-01 | End: 2026-06-26

## 2025-07-01 RX ORDER — METOPROLOL SUCCINATE 50 MG/1
50 TABLET, EXTENDED RELEASE ORAL DAILY
Qty: 90 TABLET | Refills: 3 | Status: SHIPPED | OUTPATIENT
Start: 2025-07-01

## 2025-07-01 RX ORDER — ISOSORBIDE MONONITRATE 30 MG/1
30 TABLET, EXTENDED RELEASE ORAL DAILY
Qty: 90 TABLET | Refills: 3 | Status: SHIPPED | OUTPATIENT
Start: 2025-07-01

## 2025-07-01 RX ORDER — OMEPRAZOLE 40 MG/1
CAPSULE, DELAYED RELEASE ORAL
Qty: 90 CAPSULE | Refills: 3 | Status: SHIPPED | OUTPATIENT
Start: 2025-07-01

## 2025-07-01 RX ORDER — AMLODIPINE BESYLATE 5 MG/1
5 TABLET ORAL DAILY
Qty: 90 TABLET | Refills: 3 | Status: SHIPPED | OUTPATIENT
Start: 2025-07-01

## 2025-07-01 NOTE — PROGRESS NOTES
[address] [facility], [place] [address]  PHONE: [phone]     25    NAME:  Cathryn Baldwin  : 1945  MRN: [id number]       SUBJECTIVE:   Cathryn Baldwin is a 79 y.o. female seen for a follow up visit regarding the following:     Chief Complaint   Patient presents with    Congestive Heart Failure    Coronary Artery Disease       HPI:   Here for CAD, 4v CABG on 16 by Dr. Jackson.     Less than 50% ICAs .     Echo 2022: low normal EF.   LHC 2022: severe CAD, redo CABG recommended     Feeling better off crestor.   Walking, busy in yard all day.    Feeling well now, cannot afford repatha now.   No CP, angina now.  GIBBONS ok now.  NO CP/pressure.    No new GIBBONS, SOB.    Patient denies recent history of orthopnea, PND, excessive dizziness and/or syncope.         Ace cough after CABG.  Side effects on crestor       From [place], here since .       Past Medical History, Past Surgical History, Family history, Social History, and Medications were all reviewed with the patient today and updated as necessary.     Current Outpatient Medications   Medication Sig Dispense Refill    amLODIPine (NORVASC) 5 MG tablet Take 1 tablet by mouth daily 90 tablet 3    famotidine (PEPCID) 40 MG tablet Take 1 tablet by mouth nightly 90 tablet 3    fenofibrate 160 MG tablet Take 1 tablet by mouth daily 90 tablet 3    isosorbide mononitrate (IMDUR) 30 MG extended release tablet Take 1 tablet by mouth daily 90 tablet 3    metoprolol succinate (TOPROL XL) 50 MG extended release tablet Take 1 tablet by mouth daily 90 tablet 3    omeprazole (PRILOSEC) 40 MG delayed release capsule TAKE 1 CAPSULE BY MOUTH EVERY DAY 30 MIN BEFORE BREAKFAST 90 capsule 3    pravastatin (PRAVACHOL) 40 MG tablet Take 1 tablet by mouth daily 90 tablet 1    Apoaequorin (PREVAGEN) 10 MG CAPS Take by mouth      Multiple Vitamins-Minerals (MULTIVITAMIN ADULTS PO) Take 1 tablet by mouth daily      Cyanocobalamin

## 2025-07-14 ENCOUNTER — TELEPHONE (OUTPATIENT)
Dept: FAMILY MEDICINE CLINIC | Facility: CLINIC | Age: 80
End: 2025-07-14

## 2025-07-14 NOTE — TELEPHONE ENCOUNTER
Patient came in asking a referral be sent into Dr. Jesse Bunch (orthopedic) for her knee. Please advise if we can or can't do this. Her next appt is 08/28/2025 with Patidar.

## 2025-07-28 ENCOUNTER — LAB (OUTPATIENT)
Dept: FAMILY MEDICINE CLINIC | Facility: CLINIC | Age: 80
End: 2025-07-28

## 2025-07-28 DIAGNOSIS — I10 ESSENTIAL HYPERTENSION WITH GOAL BLOOD PRESSURE LESS THAN 140/90: Chronic | ICD-10-CM

## 2025-07-28 DIAGNOSIS — E11.59 TYPE 2 DIABETES MELLITUS WITH VASCULAR DISEASE (HCC): Chronic | ICD-10-CM

## 2025-07-28 LAB
ALBUMIN SERPL-MCNC: 4 G/DL (ref 3.2–4.6)
ALBUMIN/GLOB SERPL: 1 (ref 1–1.9)
ALP SERPL-CCNC: 73 U/L (ref 35–104)
ALT SERPL-CCNC: 30 U/L (ref 8–45)
ANION GAP SERPL CALC-SCNC: 11 MMOL/L (ref 7–16)
AST SERPL-CCNC: 21 U/L (ref 15–37)
BILIRUB SERPL-MCNC: 0.3 MG/DL (ref 0–1.2)
BUN SERPL-MCNC: 14 MG/DL (ref 8–23)
CALCIUM SERPL-MCNC: 10.4 MG/DL (ref 8.8–10.2)
CHLORIDE SERPL-SCNC: 102 MMOL/L (ref 98–107)
CO2 SERPL-SCNC: 27 MMOL/L (ref 20–29)
CREAT SERPL-MCNC: 0.71 MG/DL (ref 0.6–1.1)
CREAT UR-MCNC: 162 MG/DL (ref 28–217)
EST. AVERAGE GLUCOSE BLD GHB EST-MCNC: 211 MG/DL
GLOBULIN SER CALC-MCNC: 3.9 G/DL (ref 2.3–3.5)
GLUCOSE SERPL-MCNC: 217 MG/DL (ref 70–99)
HBA1C MFR BLD: 9 % (ref 0–5.6)
MICROALBUMIN UR-MCNC: 4.02 MG/DL (ref 0–20)
MICROALBUMIN/CREAT UR-RTO: 25 MG/G (ref 0–30)
POTASSIUM SERPL-SCNC: 4.7 MMOL/L (ref 3.5–5.1)
PROT SERPL-MCNC: 8 G/DL (ref 6.3–8.2)
SODIUM SERPL-SCNC: 140 MMOL/L (ref 136–145)

## 2025-08-07 ENCOUNTER — OFFICE VISIT (OUTPATIENT)
Dept: FAMILY MEDICINE CLINIC | Facility: CLINIC | Age: 80
End: 2025-08-07
Payer: MEDICARE

## 2025-08-07 VITALS
BODY MASS INDEX: 31.12 KG/M2 | HEIGHT: 62 IN | HEART RATE: 54 BPM | WEIGHT: 169.1 LBS | DIASTOLIC BLOOD PRESSURE: 74 MMHG | OXYGEN SATURATION: 95 % | SYSTOLIC BLOOD PRESSURE: 175 MMHG | TEMPERATURE: 97.2 F

## 2025-08-07 DIAGNOSIS — I50.22 CHRONIC SYSTOLIC (CONGESTIVE) HEART FAILURE (HCC): ICD-10-CM

## 2025-08-07 DIAGNOSIS — K21.9 GASTROESOPHAGEAL REFLUX DISEASE, UNSPECIFIED WHETHER ESOPHAGITIS PRESENT: ICD-10-CM

## 2025-08-07 DIAGNOSIS — R80.9 TYPE 2 DIABETES MELLITUS WITH DIABETIC MICROALBUMINURIA, WITHOUT LONG-TERM CURRENT USE OF INSULIN (HCC): ICD-10-CM

## 2025-08-07 DIAGNOSIS — I25.118 CORONARY ARTERY DISEASE OF NATIVE ARTERY OF NATIVE HEART WITH STABLE ANGINA PECTORIS: ICD-10-CM

## 2025-08-07 DIAGNOSIS — E83.52 HYPERCALCEMIA: ICD-10-CM

## 2025-08-07 DIAGNOSIS — E66.811 CLASS 1 OBESITY DUE TO EXCESS CALORIES WITH SERIOUS COMORBIDITY AND BODY MASS INDEX (BMI) OF 30.0 TO 30.9 IN ADULT: ICD-10-CM

## 2025-08-07 DIAGNOSIS — E66.09 CLASS 1 OBESITY DUE TO EXCESS CALORIES WITH SERIOUS COMORBIDITY AND BODY MASS INDEX (BMI) OF 30.0 TO 30.9 IN ADULT: ICD-10-CM

## 2025-08-07 DIAGNOSIS — I10 ESSENTIAL HYPERTENSION WITH GOAL BLOOD PRESSURE LESS THAN 140/90: ICD-10-CM

## 2025-08-07 DIAGNOSIS — E11.29 TYPE 2 DIABETES MELLITUS WITH DIABETIC MICROALBUMINURIA, WITHOUT LONG-TERM CURRENT USE OF INSULIN (HCC): ICD-10-CM

## 2025-08-07 DIAGNOSIS — Z00.00 MEDICARE ANNUAL WELLNESS VISIT, SUBSEQUENT: Primary | ICD-10-CM

## 2025-08-07 DIAGNOSIS — E78.00 PURE HYPERCHOLESTEROLEMIA: ICD-10-CM

## 2025-08-07 DIAGNOSIS — E11.59 TYPE 2 DIABETES MELLITUS WITH VASCULAR DISEASE (HCC): ICD-10-CM

## 2025-08-07 DIAGNOSIS — N18.2 STAGE 2 CHRONIC KIDNEY DISEASE: ICD-10-CM

## 2025-08-07 PROCEDURE — G8417 CALC BMI ABV UP PARAM F/U: HCPCS

## 2025-08-07 PROCEDURE — 3052F HG A1C>EQUAL 8.0%<EQUAL 9.0%: CPT

## 2025-08-07 PROCEDURE — 1036F TOBACCO NON-USER: CPT

## 2025-08-07 PROCEDURE — 3077F SYST BP >= 140 MM HG: CPT

## 2025-08-07 PROCEDURE — G0439 PPPS, SUBSEQ VISIT: HCPCS

## 2025-08-07 PROCEDURE — 99214 OFFICE O/P EST MOD 30 MIN: CPT

## 2025-08-07 PROCEDURE — 1123F ACP DISCUSS/DSCN MKR DOCD: CPT

## 2025-08-07 PROCEDURE — G2211 COMPLEX E/M VISIT ADD ON: HCPCS

## 2025-08-07 PROCEDURE — 1160F RVW MEDS BY RX/DR IN RCRD: CPT

## 2025-08-07 PROCEDURE — 1090F PRES/ABSN URINE INCON ASSESS: CPT

## 2025-08-07 PROCEDURE — G8427 DOCREV CUR MEDS BY ELIG CLIN: HCPCS

## 2025-08-07 PROCEDURE — G8400 PT W/DXA NO RESULTS DOC: HCPCS

## 2025-08-07 PROCEDURE — 1159F MED LIST DOCD IN RCRD: CPT

## 2025-08-07 PROCEDURE — 3078F DIAST BP <80 MM HG: CPT

## 2025-08-07 RX ORDER — GLIPIZIDE 10 MG/1
10 TABLET ORAL 2 TIMES DAILY
Qty: 360 TABLET | Refills: 1 | Status: SHIPPED | OUTPATIENT
Start: 2025-08-07 | End: 2026-02-03

## 2025-08-07 RX ORDER — METFORMIN HYDROCHLORIDE 500 MG/1
1000 TABLET, EXTENDED RELEASE ORAL
Qty: 180 TABLET | Refills: 1 | Status: SHIPPED | OUTPATIENT
Start: 2025-08-07

## 2025-08-07 SDOH — ECONOMIC STABILITY: FOOD INSECURITY: WITHIN THE PAST 12 MONTHS, YOU WORRIED THAT YOUR FOOD WOULD RUN OUT BEFORE YOU GOT MONEY TO BUY MORE.: SOMETIMES TRUE

## 2025-08-07 SDOH — ECONOMIC STABILITY: FOOD INSECURITY: WITHIN THE PAST 12 MONTHS, THE FOOD YOU BOUGHT JUST DIDN'T LAST AND YOU DIDN'T HAVE MONEY TO GET MORE.: SOMETIMES TRUE

## 2025-08-07 ASSESSMENT — PATIENT HEALTH QUESTIONNAIRE - PHQ9
SUM OF ALL RESPONSES TO PHQ QUESTIONS 1-9: 8
4. FEELING TIRED OR HAVING LITTLE ENERGY: MORE THAN HALF THE DAYS
SUM OF ALL RESPONSES TO PHQ QUESTIONS 1-9: 8
3. TROUBLE FALLING OR STAYING ASLEEP: SEVERAL DAYS
1. LITTLE INTEREST OR PLEASURE IN DOING THINGS: MORE THAN HALF THE DAYS
2. FEELING DOWN, DEPRESSED OR HOPELESS: MORE THAN HALF THE DAYS
5. POOR APPETITE OR OVEREATING: SEVERAL DAYS
10. IF YOU CHECKED OFF ANY PROBLEMS, HOW DIFFICULT HAVE THESE PROBLEMS MADE IT FOR YOU TO DO YOUR WORK, TAKE CARE OF THINGS AT HOME, OR GET ALONG WITH OTHER PEOPLE: SOMEWHAT DIFFICULT
7. TROUBLE CONCENTRATING ON THINGS, SUCH AS READING THE NEWSPAPER OR WATCHING TELEVISION: NOT AT ALL
6. FEELING BAD ABOUT YOURSELF - OR THAT YOU ARE A FAILURE OR HAVE LET YOURSELF OR YOUR FAMILY DOWN: NOT AT ALL
8. MOVING OR SPEAKING SO SLOWLY THAT OTHER PEOPLE COULD HAVE NOTICED. OR THE OPPOSITE, BEING SO FIGETY OR RESTLESS THAT YOU HAVE BEEN MOVING AROUND A LOT MORE THAN USUAL: NOT AT ALL
SUM OF ALL RESPONSES TO PHQ QUESTIONS 1-9: 8
SUM OF ALL RESPONSES TO PHQ QUESTIONS 1-9: 8
9. THOUGHTS THAT YOU WOULD BE BETTER OFF DEAD, OR OF HURTING YOURSELF: NOT AT ALL

## 2025-08-07 ASSESSMENT — LIFESTYLE VARIABLES
HOW OFTEN DO YOU HAVE A DRINK CONTAINING ALCOHOL: 2-4 TIMES A MONTH
HOW MANY STANDARD DRINKS CONTAINING ALCOHOL DO YOU HAVE ON A TYPICAL DAY: 1 OR 2

## 2025-08-14 ENCOUNTER — OFFICE VISIT (OUTPATIENT)
Dept: FAMILY MEDICINE CLINIC | Facility: CLINIC | Age: 80
End: 2025-08-14
Payer: MEDICARE

## 2025-08-14 VITALS
SYSTOLIC BLOOD PRESSURE: 196 MMHG | HEART RATE: 57 BPM | OXYGEN SATURATION: 97 % | WEIGHT: 166.5 LBS | DIASTOLIC BLOOD PRESSURE: 95 MMHG | TEMPERATURE: 97.5 F | HEIGHT: 62 IN | BODY MASS INDEX: 30.64 KG/M2

## 2025-08-14 DIAGNOSIS — I83.91 ASYMPTOMATIC VARICOSE VEINS OF RIGHT LOWER EXTREMITY: ICD-10-CM

## 2025-08-14 DIAGNOSIS — E11.29 TYPE 2 DIABETES MELLITUS WITH DIABETIC MICROALBUMINURIA, WITHOUT LONG-TERM CURRENT USE OF INSULIN (HCC): ICD-10-CM

## 2025-08-14 DIAGNOSIS — M25.561 RIGHT KNEE PAIN, UNSPECIFIED CHRONICITY: Primary | ICD-10-CM

## 2025-08-14 DIAGNOSIS — R80.9 TYPE 2 DIABETES MELLITUS WITH DIABETIC MICROALBUMINURIA, WITHOUT LONG-TERM CURRENT USE OF INSULIN (HCC): ICD-10-CM

## 2025-08-14 DIAGNOSIS — I10 ESSENTIAL HYPERTENSION WITH GOAL BLOOD PRESSURE LESS THAN 140/90: ICD-10-CM

## 2025-08-14 PROCEDURE — 3080F DIAST BP >= 90 MM HG: CPT

## 2025-08-14 PROCEDURE — G8427 DOCREV CUR MEDS BY ELIG CLIN: HCPCS

## 2025-08-14 PROCEDURE — 1159F MED LIST DOCD IN RCRD: CPT

## 2025-08-14 PROCEDURE — 3052F HG A1C>EQUAL 8.0%<EQUAL 9.0%: CPT

## 2025-08-14 PROCEDURE — G8417 CALC BMI ABV UP PARAM F/U: HCPCS

## 2025-08-14 PROCEDURE — G8400 PT W/DXA NO RESULTS DOC: HCPCS

## 2025-08-14 PROCEDURE — 99214 OFFICE O/P EST MOD 30 MIN: CPT

## 2025-08-14 PROCEDURE — 1036F TOBACCO NON-USER: CPT

## 2025-08-14 PROCEDURE — 1123F ACP DISCUSS/DSCN MKR DOCD: CPT

## 2025-08-14 PROCEDURE — 1090F PRES/ABSN URINE INCON ASSESS: CPT

## 2025-08-14 PROCEDURE — 3077F SYST BP >= 140 MM HG: CPT

## 2025-09-02 ENCOUNTER — TELEPHONE (OUTPATIENT)
Age: 80
End: 2025-09-02

## 2025-09-02 ENCOUNTER — OFFICE VISIT (OUTPATIENT)
Dept: FAMILY MEDICINE CLINIC | Facility: CLINIC | Age: 80
End: 2025-09-02
Payer: MEDICARE

## 2025-09-02 VITALS
HEART RATE: 80 BPM | HEIGHT: 62 IN | TEMPERATURE: 97.2 F | RESPIRATION RATE: 16 BRPM | DIASTOLIC BLOOD PRESSURE: 105 MMHG | OXYGEN SATURATION: 97 % | BODY MASS INDEX: 30.55 KG/M2 | SYSTOLIC BLOOD PRESSURE: 188 MMHG | WEIGHT: 166 LBS

## 2025-09-02 DIAGNOSIS — M79.604 RIGHT LEG PAIN: ICD-10-CM

## 2025-09-02 DIAGNOSIS — I83.90 VARICOSE VEINS OF CALF: ICD-10-CM

## 2025-09-02 DIAGNOSIS — E78.2 MIXED HYPERLIPIDEMIA: ICD-10-CM

## 2025-09-02 DIAGNOSIS — I10 ESSENTIAL HYPERTENSION WITH GOAL BLOOD PRESSURE LESS THAN 140/90: ICD-10-CM
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
## 2025-09-02 DIAGNOSIS — M25.551 RIGHT HIP PAIN: ICD-10-CM

## 2025-09-02 DIAGNOSIS — E11.59 TYPE 2 DIABETES MELLITUS WITH VASCULAR DISEASE (HCC): ICD-10-CM

## 2025-09-02 DIAGNOSIS — I25.118 CORONARY ARTERY DISEASE OF NATIVE ARTERY OF NATIVE HEART WITH STABLE ANGINA PECTORIS: ICD-10-CM

## 2025-09-02 DIAGNOSIS — R20.9 COLD RIGHT FOOT: ICD-10-CM

## 2025-09-02 DIAGNOSIS — G62.9 NEUROPATHY: Primary | ICD-10-CM

## 2025-09-02 LAB
ALBUMIN SERPL-MCNC: 4.1 G/DL (ref 3.2–4.6)
ALBUMIN/GLOB SERPL: 1.1 (ref 1–1.9)
ALP SERPL-CCNC: 69 U/L (ref 35–104)
ALT SERPL-CCNC: 29 U/L (ref 8–45)
ANION GAP SERPL CALC-SCNC: 13 MMOL/L (ref 7–16)
AST SERPL-CCNC: 23 U/L (ref 15–37)
BILIRUB SERPL-MCNC: 0.3 MG/DL (ref 0–1.2)
BUN SERPL-MCNC: 20 MG/DL (ref 8–23)
CALCIUM SERPL-MCNC: 9.9 MG/DL (ref 8.8–10.2)
CHLORIDE SERPL-SCNC: 104 MMOL/L (ref 98–107)
CHOLEST SERPL-MCNC: 196 MG/DL (ref 0–200)
CO2 SERPL-SCNC: 23 MMOL/L (ref 20–29)
CREAT SERPL-MCNC: 0.81 MG/DL (ref 0.6–1.1)
EST. AVERAGE GLUCOSE BLD GHB EST-MCNC: 184 MG/DL
FOLATE SERPL-MCNC: 19.2 NG/ML (ref 3.1–17.5)
GLOBULIN SER CALC-MCNC: 3.7 G/DL (ref 2.3–3.5)
GLUCOSE SERPL-MCNC: 175 MG/DL (ref 70–99)
HBA1C MFR BLD: 8 % (ref 0–5.6)
HDLC SERPL-MCNC: 44 MG/DL (ref 40–60)
HDLC SERPL: 4.5 (ref 0–5)
LDLC SERPL CALC-MCNC: 93 MG/DL (ref 0–100)
POTASSIUM SERPL-SCNC: 4.1 MMOL/L (ref 3.5–5.1)
PROT SERPL-MCNC: 7.8 G/DL (ref 6.3–8.2)
SODIUM SERPL-SCNC: 140 MMOL/L (ref 136–145)
TRIGL SERPL-MCNC: 297 MG/DL (ref 0–150)
VIT B12 SERPL-MCNC: 827 PG/ML (ref 193–986)
VLDLC SERPL CALC-MCNC: 59 MG/DL (ref 6–23)

## 2025-09-02 PROCEDURE — 1159F MED LIST DOCD IN RCRD: CPT

## 2025-09-02 PROCEDURE — 1125F AMNT PAIN NOTED PAIN PRSNT: CPT

## 2025-09-02 PROCEDURE — 3052F HG A1C>EQUAL 8.0%<EQUAL 9.0%: CPT

## 2025-09-02 PROCEDURE — G8400 PT W/DXA NO RESULTS DOC: HCPCS

## 2025-09-02 PROCEDURE — 1123F ACP DISCUSS/DSCN MKR DOCD: CPT

## 2025-09-02 PROCEDURE — 3077F SYST BP >= 140 MM HG: CPT

## 2025-09-02 PROCEDURE — 3080F DIAST BP >= 90 MM HG: CPT

## 2025-09-02 PROCEDURE — G8417 CALC BMI ABV UP PARAM F/U: HCPCS

## 2025-09-02 PROCEDURE — 99213 OFFICE O/P EST LOW 20 MIN: CPT

## 2025-09-02 PROCEDURE — 1090F PRES/ABSN URINE INCON ASSESS: CPT

## 2025-09-02 PROCEDURE — G8427 DOCREV CUR MEDS BY ELIG CLIN: HCPCS

## 2025-09-02 PROCEDURE — 1036F TOBACCO NON-USER: CPT

## 2025-09-05 ENCOUNTER — TELEPHONE (OUTPATIENT)
Dept: FAMILY MEDICINE CLINIC | Facility: CLINIC | Age: 80
End: 2025-09-05

## (undated) DEVICE — DEVICE CLSR 5FR BIOABSRB FULL INTEGR RAP HEMSTAS FOR FEM

## (undated) DEVICE — CATHETER DIAG 5FR L110CM PIG CRV SZ 6 SIDE H DBL BRAID WIRE

## (undated) DEVICE — HI-TORQUE VERSACORE MODIFIED J GUIDE WIRE SYSTEM 145 CM: Brand: HI-TORQUE VERSACORE

## (undated) DEVICE — CATHETER DIAG 5FR L100CM LUMN ID0.047IN JR4 CRV 0 SIDE H

## (undated) DEVICE — CATHETER ANGIO JL4 0.045 INX5 FRX100 CM THRULUMEN EXPO

## (undated) DEVICE — INTRODUCER SHTH 5FR CANN L11CM GWIRE 0.038IN STD KINK

## (undated) DEVICE — GUIDEWIRE VASC L150CM DIA0.035IN FLX END L7CM J 3MM PTFE

## (undated) DEVICE — CATHETER COR DIAG SPEC LCB CRV 5FR 100CM 0 SIDE H DXTERITY